# Patient Record
Sex: MALE | Race: WHITE | NOT HISPANIC OR LATINO | Employment: FULL TIME | URBAN - METROPOLITAN AREA
[De-identification: names, ages, dates, MRNs, and addresses within clinical notes are randomized per-mention and may not be internally consistent; named-entity substitution may affect disease eponyms.]

---

## 2017-05-24 ENCOUNTER — TELEPHONE (OUTPATIENT)
Dept: FAMILY MEDICINE | Facility: CLINIC | Age: 52
End: 2017-05-24

## 2017-05-24 NOTE — TELEPHONE ENCOUNTER
----- Message from Faby Rich sent at 5/24/2017  3:22 PM CDT -----  Contact: self  Pt called in about wanting to get appt. Pt also wants to do blood work. Please schedule blood work for pt        Thank You

## 2017-05-29 ENCOUNTER — OFFICE VISIT (OUTPATIENT)
Dept: FAMILY MEDICINE | Facility: CLINIC | Age: 52
End: 2017-05-29
Payer: COMMERCIAL

## 2017-05-29 VITALS
DIASTOLIC BLOOD PRESSURE: 78 MMHG | BODY MASS INDEX: 26.53 KG/M2 | HEIGHT: 72 IN | SYSTOLIC BLOOD PRESSURE: 112 MMHG | OXYGEN SATURATION: 98 % | RESPIRATION RATE: 16 BRPM | TEMPERATURE: 98 F | HEART RATE: 66 BPM | WEIGHT: 195.88 LBS

## 2017-05-29 DIAGNOSIS — Z00.00 ANNUAL PHYSICAL EXAM: Primary | ICD-10-CM

## 2017-05-29 PROCEDURE — 99386 PREV VISIT NEW AGE 40-64: CPT | Mod: S$GLB,,, | Performed by: FAMILY MEDICINE

## 2017-05-29 PROCEDURE — 90715 TDAP VACCINE 7 YRS/> IM: CPT | Mod: S$GLB,,, | Performed by: FAMILY MEDICINE

## 2017-05-29 PROCEDURE — 99999 PR PBB SHADOW E&M-EST. PATIENT-LVL III: CPT | Mod: PBBFAC,,, | Performed by: FAMILY MEDICINE

## 2017-05-29 PROCEDURE — 90471 IMMUNIZATION ADMIN: CPT | Mod: S$GLB,,, | Performed by: FAMILY MEDICINE

## 2017-05-29 RX ORDER — PREDNISONE 20 MG/1
TABLET ORAL
Refills: 0 | COMMUNITY
Start: 2017-04-12 | End: 2017-05-29

## 2017-05-29 RX ORDER — AMOXICILLIN AND CLAVULANATE POTASSIUM 875; 125 MG/1; MG/1
TABLET, FILM COATED ORAL
Refills: 0 | COMMUNITY
Start: 2017-04-12 | End: 2017-05-29

## 2017-05-29 RX ORDER — OSELTAMIVIR PHOSPHATE 75 MG/1
CAPSULE ORAL
Refills: 0 | COMMUNITY
Start: 2017-04-12 | End: 2017-05-29

## 2017-05-29 NOTE — MEDICAL/APP STUDENT
Subjective:       Patient ID: Marty Olivarez is a 51 y.o. male.    Chief Complaint: Establish Care    HPI 51 year old male here to establish care. He has no complaints.   He has no past medical or surgical history. He takes a baby aspirin daily and no other medications.  His family history includes stroke and SLE in sister, MI in brother, and PE post surgery in his mother.   His last dTap was 2006 and is due for another one. He has not gotten bloodwork for over 2 years. He has never been checked for Hep C.  He is up to date on colonoscopy which he had at [facility] a few years ago. He had a colonoscopy and endoscopy done because of reflux symptoms. He reports both were normal. He denies any current reflux.  He does not exercise. He does not smoke. He drinks 6 beers 2-3 times/week. Diet consists of mostly home cooked meals with vegetables, fruits.    Review of Systems   Constitutional: Negative for chills, fever and unexpected weight change.   HENT: Negative for congestion, postnasal drip and sinus pressure.    Respiratory: Positive for cough (mild nonproductive). Negative for shortness of breath.    Cardiovascular: Negative for chest pain, palpitations and leg swelling.   Gastrointestinal: Negative for abdominal pain, constipation, diarrhea, nausea and vomiting.   Genitourinary: Negative for difficulty urinating.   Musculoskeletal: Negative for arthralgias and joint swelling.   Neurological: Negative for weakness, numbness and headaches.   Psychiatric/Behavioral: The patient is not nervous/anxious.        Objective:      Physical Exam   Constitutional: He is oriented to person, place, and time. He appears well-developed and well-nourished. No distress.   HENT:   Head: Normocephalic and atraumatic.   Eyes: EOM are normal. Pupils are equal, round, and reactive to light.   Cardiovascular: Normal rate, regular rhythm and normal heart sounds.    No murmur heard.  Pulmonary/Chest: Effort normal and breath sounds normal.  No respiratory distress.   Abdominal: Soft. Bowel sounds are normal. There is no tenderness.   Lymphadenopathy:     He has no cervical adenopathy.   Neurological: He is alert and oriented to person, place, and time. No cranial nerve deficit.   Power 5/5 throughout. Sensation intact throughout.   Psychiatric: He has a normal mood and affect. His behavior is normal.       Assessment:       1. Annual physical exam        Plan:       1. Annual Physical exam  -Immunizations: Tdap  -Labs: BMP, CBC, Hep C, fasting lipid panel  -Recommend 150 min exercise per week      Whitney STOKES [facility]  Medical Student Yr 4

## 2017-05-29 NOTE — PATIENT INSTRUCTIONS
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
## 2017-05-30 ENCOUNTER — TELEPHONE (OUTPATIENT)
Dept: FAMILY MEDICINE | Facility: CLINIC | Age: 52
End: 2017-05-30

## 2017-06-01 ENCOUNTER — TELEPHONE (OUTPATIENT)
Dept: FAMILY MEDICINE | Facility: CLINIC | Age: 52
End: 2017-06-01

## 2017-06-01 NOTE — TELEPHONE ENCOUNTER
----- Message from Lani Piper MD sent at 6/1/2017  1:44 PM CDT -----  Normal cholesterol panel  Normal kidney function  Negative hepatitis c screening test

## 2017-08-28 PROBLEM — Z00.00 ANNUAL PHYSICAL EXAM: Status: RESOLVED | Noted: 2017-05-29 | Resolved: 2017-08-28

## 2019-07-08 PROBLEM — Z86.0100 HISTORY OF COLON POLYPS: Status: ACTIVE | Noted: 2019-07-08

## 2019-07-08 PROBLEM — E55.9 VITAMIN D INSUFFICIENCY: Status: ACTIVE | Noted: 2019-07-08

## 2019-07-08 PROBLEM — R17 ELEVATED BILIRUBIN: Status: ACTIVE | Noted: 2019-07-08

## 2019-07-08 PROBLEM — Z86.010 HISTORY OF COLON POLYPS: Status: ACTIVE | Noted: 2019-07-08

## 2019-07-08 NOTE — PROGRESS NOTES
"FAMILY MEDICINE    Patient Active Problem List   Diagnosis    Elevated bilirubin    Vitamin D insufficiency    History of colon polyps    Shortness of breath    Family history of early CAD       CC:   Chief Complaint   Patient presents with    Establish Care       HPI: Marty Olivarez is a 53 y.o. male  - with history of colon polyps (last colonoscopy 2/2019 by Dr. Malcolm Kinsey [facility]ERRITT signed and request copy) presents to routine physical and establish care. Reports concerns for shortness of breath    1. Shortness of breath  Onset: 1-2 months  Duration: several seconds to minutes   Presentation: reports that suddenly will get bouts of "catching my breath" and also has noted decreased exercise tolerance but unsure if related with weight gain and decreased exercise since his divorce about 1 year ago. SOB only seems to last several seconds  - concern since brother was diagnosed with complete occlusion of LAD   Aggravating factors: unsure  Relieving factors: rest  Timing of day: anytime  Associated symptoms: cough  Negative symptoms: denies chest pain, leg swelling, weakness, lightheadedness, daiphoresis        HEALTH MAINTENANCE:   Health Maintenance   Topic Date Due    Colonoscopy  09/11/2015    Influenza Vaccine  08/01/2019    Lipid Panel  02/06/2024    TETANUS VACCINE  05/29/2027    Hepatitis C Screening  Completed       ROS: Review of Systems   Constitutional: Negative for activity change, appetite change and unexpected weight change.   HENT: Positive for postnasal drip and rhinorrhea. Negative for congestion, sinus pressure, sinus pain, tinnitus, trouble swallowing and voice change.    Eyes: Negative for visual disturbance.   Respiratory: Positive for cough and shortness of breath. Negative for chest tightness.    Cardiovascular: Negative for chest pain, palpitations and leg swelling.   Gastrointestinal: Negative for abdominal distention, abdominal pain, anal bleeding, blood in stool, constipation, " diarrhea, nausea and vomiting.   Endocrine: Negative for cold intolerance, heat intolerance, polydipsia, polyphagia and polyuria.   Genitourinary: Negative for difficulty urinating and urgency.   Musculoskeletal: Positive for arthralgias (bilateral knees chronic). Negative for gait problem and joint swelling.   Skin: Negative for color change, pallor and rash.   Allergic/Immunologic: Negative.    Neurological: Negative for dizziness, syncope, weakness, light-headedness and headaches.   Hematological: Negative.    Psychiatric/Behavioral: Negative for dysphoric mood and sleep disturbance. The patient is not nervous/anxious.        ALLERGIES:   Review of patient's allergies indicates:  No Known Allergies    MEDS:     Current Outpatient Medications:     varicella-zoster [id number]-[id number], PF, (SHINGRIX, PF,) 50 mcg/0.5 mL injection, Inject 0.5mL IM at 0 and 2-6 months, Disp: 0.5 mL, Rfl: 1    History reviewed. No pertinent past medical history.    History reviewed. No pertinent surgical history.    Family History   Problem Relation Age of Onset    Pulmonary embolism Mother         s/p knee surgery    Hyperlipidemia Father     Hypertension Father     Lupus Sister     Stroke Sister     Heart disease Brother 60        MI LAD occlusion    No Known Problems Daughter     No Known Problems Brother     No Known Problems Daughter        Social History     Tobacco Use    Smoking status: Former Smoker     Last attempt to quit: 1980     Years since quittin.5    Smokeless tobacco: Never Used   Substance Use Topics    Alcohol use: Yes     Alcohol/week: 3.6 oz     Types: 6 Cans of beer per week     Comment: 2-3 times/weekly     Drug use: No       Social History     Social History Narrative    Not on file       OBJECTIVE:   Vitals:    19 0752   BP: 124/86   BP Location: Left arm   Patient Position: Sitting   BP Method: Large (Manual)   Pulse: 68   Temp: 98 °F (36.7 °C)   TempSrc: Oral   SpO2: 98%   Weight: 91.1 kg (200  lb 14.4 oz)   Height: 6' (1.829 m)     Body mass index is 27.25 kg/m².    Physical Exam   Constitutional: He is oriented to person, place, and time. No distress.   HENT:   Head: Normocephalic and atraumatic.   Right Ear: Tympanic membrane and ear canal normal.   Left Ear: Tympanic membrane and ear canal normal.   Nose: Nose normal.   Mouth/Throat: Uvula is midline, oropharynx is clear and moist and mucous membranes are normal.   Eyes: Pupils are equal, round, and reactive to light. Conjunctivae and EOM are normal.   Neck: Trachea normal. Neck supple. Normal carotid pulses and no JVD present. Carotid bruit is not present. No thyromegaly present.   Cardiovascular: Normal rate, regular rhythm, normal heart sounds and intact distal pulses. Exam reveals no gallop and no friction rub.   No murmur heard.  Pulmonary/Chest: Effort normal and breath sounds normal. He has no decreased breath sounds. He has no wheezes. He has no rhonchi. He has no rales.   Abdominal: Soft. Normal appearance and bowel sounds are normal. There is no tenderness.   Neurological: He is alert and oriented to person, place, and time.   Skin: Skin is warm. Capillary refill takes less than 2 seconds. No cyanosis. Nails show no clubbing.         Depression Patient Health Questionnaire 7/9/2019   Over the last two weeks how often have you been bothered by little interest or pleasure in doing things 0   Over the last two weeks how often have you been bothered by feeling down, depressed or hopeless 0   PHQ-2 Total Score 0       PERTINENT RESULTS:   No visits with results within 5 Month(s) from this visit.   Latest known visit with results is:   Lab Visit on 02/06/2019   Component Date Value Ref Range Status    Vit D, 25-Hydroxy 02/06/2019 18* 30 - 96 ng/mL Final    Comment: Vitamin D deficiency.........<10 ng/mL                              Vitamin D insufficiency......10-29 ng/mL       Vitamin D sufficiency........> or equal to 30 ng/mL  Vitamin D  toxicity............>100 ng/mL      WBC 02/06/2019 6.51  3.90 - 12.70 K/uL Final    RBC 02/06/2019 4.63  4.60 - 6.20 M/uL Final    Hemoglobin 02/06/2019 15.1  14.0 - 18.0 g/dL Final    Hematocrit 02/06/2019 42.7  40.0 - 54.0 % Final    Mean Corpuscular Volume 02/06/2019 92  82 - 98 fL Final    Mean Corpuscular Hemoglobin 02/06/2019 32.6* 27.0 - 31.0 pg Final    Mean Corpuscular Hemoglobin Conc 02/06/2019 35.4  32.0 - 36.0 g/dL Final    RDW 02/06/2019 12.4  11.5 - 14.5 % Final    Platelets 02/06/2019 280  150 - 350 K/uL Final    MPV 02/06/2019 10.1  9.2 - 12.9 fL Final    Gran # (ANC) 02/06/2019 4.0  1.8 - 7.7 K/uL Final    Lymph # 02/06/2019 1.7  1.0 - 4.8 K/uL Final    Mono # 02/06/2019 0.6  0.3 - 1.0 K/uL Final    Eos # 02/06/2019 0.2  0.0 - 0.5 K/uL Final    Baso # 02/06/2019 0.04  0.00 - 0.20 K/uL Final    Gran% 02/06/2019 61.2  38.0 - 73.0 % Final    Lymph% 02/06/2019 26.4  18.0 - 48.0 % Final    Mono% 02/06/2019 9.5  4.0 - 15.0 % Final    Eosinophil% 02/06/2019 2.3  0.0 - 8.0 % Final    Basophil% 02/06/2019 0.6  0.0 - 1.9 % Final    Differential Method 02/06/2019 Automated   Final    Sodium 02/06/2019 142  136 - 145 mmol/L Final    Potassium 02/06/2019 4.2  3.5 - 5.1 mmol/L Final    Chloride 02/06/2019 105  95 - 110 mmol/L Final    CO2 02/06/2019 24  23 - 29 mmol/L Final    Glucose 02/06/2019 87  70 - 110 mg/dL Final    BUN, Bld 02/06/2019 18  2 - 20 mg/dL Final    Creatinine 02/06/2019 1.24  0.50 - 1.40 mg/dL Final    Calcium 02/06/2019 9.2  8.7 - 10.5 mg/dL Final    Total Protein 02/06/2019 7.5  6.0 - 8.4 g/dL Final    Albumin 02/06/2019 4.6  3.5 - 5.2 g/dL Final    Total Bilirubin 02/06/2019 2.4* 0.1 - 1.0 mg/dL Final    Comment: For infants and newborns, interpretation of results should be based  on gestational age, weight and in agreement with clinical  observations.  Premature Infant recommended reference ranges:  Up to 24 hours.............<8.0 mg/dL  Up to 48  hours............<12.0 mg/dL  3-5 days..................<15.0 mg/dL  6-29 days.................<15.0 mg/dL      Alkaline Phosphatase 02/06/2019 53  38 - 126 U/L Final    AST 02/06/2019 23  15 - 46 U/L Final    ALT 02/06/2019 23  10 - 44 U/L Final    Anion Gap 02/06/2019 13  8 - 16 mmol/L Final    eGFR if African American 02/06/2019 >60.0  >60 mL/min/1.73 m^2 Final    eGFR if non African American 02/06/2019 >60.0  >60 mL/min/1.73 m^2 Final    Comment: Calculation used to obtain the estimated glomerular filtration  rate (eGFR) is the CKD-EPI equation.       Cholesterol 02/06/2019 170  120 - 199 mg/dL Final    Comment: The National Cholesterol Education Program (NCEP) has set the  following guidelines (reference ranges) for Cholesterol:  Optimal.....................<200 mg/dL  Borderline High.............200-239 mg/dL  High........................> or = 240 mg/dL      Triglycerides 02/06/2019 144  30 - 150 mg/dL Final    Comment: The National Cholesterol Education Program (NCEP) has set the  following guidelines (reference values) for triglycerides:  Normal......................<150 mg/dL  Borderline High.............150-199 mg/dL  High........................200-499 mg/dL      HDL 02/06/2019 44  40 - 75 mg/dL Final    Comment: The National Cholesterol Education Program (NCEP) has set the  following guidelines (reference values) for HDL Cholesterol:  Low...............<40 mg/dL  Optimal...........>60 mg/dL      LDL Cholesterol 02/06/2019 97.2  63.0 - 159.0 mg/dL Final    Comment: The National Cholesterol Education Program (NCEP) has set the  following guidelines (reference values) for LDL Cholesterol:  Optimal.......................<130 mg/dL  Borderline High...............130-159 mg/dL  High..........................160-189 mg/dL  Very High.....................>190 mg/dL      Hdl/Cholesterol Ratio 02/06/2019 25.9  20.0 - 50.0 % Final    Total Cholesterol/HDL Ratio 02/06/2019 3.9  2.0 - 5.0 Final     Non-HDL Cholesterol 02/06/2019 126  mg/dL Final    Comment: Risk category and Non-HDL cholesterol goals:  Coronary heart disease (CHD)or equivalent (10-year risk of CHD >20%):  Non-HDL cholesterol goal     <130 mg/dL  Two or more CHD risk factors and 10-year risk of CHD <= 20%:  Non-HDL cholesterol goal     <160 mg/dL  0 to 1 CHD risk factor:  Non-HDL cholesterol goal     <190 mg/dL      PSA, SCREEN 02/06/2019 0.86  0.00 - 4.00 ng/mL Final    Comment: PSA Expected levels:  Hormonal Therapy: <0.05 ng/ml  Prostatectomy: <0.01 ng/ml  Radiation Therapy: <1.00 ng/ml         ASSESSMENT:  Problem List Items Addressed This Visit        Endocrine    Vitamin D insufficiency    Current Assessment & Plan     - rec supplement OTC D3 1303-8727 units daily            GI    Elevated bilirubin    Current Assessment & Plan     - asymptomatic  - likely Gilbert's   - monitor         History of colon polyps    Overview     - followed by GI Dr. Malcolm Kinsey         Current Assessment & Plan     - request copy of colonoscopy  - pt reports due on 3 years            Other    Shortness of breath - Primary    Current Assessment & Plan     - exam benign  - EKG today  - rec CXR  - last 2/2019 reviewed  - refer to Cardiology due to family history         Relevant Orders    EKG 12-lead    X-Ray Chest PA And Lateral    Family history of early CAD    Relevant Orders    Ambulatory referral to Cardiology          PLAN:   Orders Placed This Encounter    X-Ray Chest PA And Lateral    Ambulatory referral to Cardiology    EKG 12-lead    varicella-zoster [id number]-AS01B, PF, (SHINGRIX, PF,) 50 mcg/0.5 mL injection     Follow-up in yearly or as needed.     Dr. Gaye Martin D.O.   Family Medicine

## 2019-07-09 ENCOUNTER — OFFICE VISIT (OUTPATIENT)
Dept: FAMILY MEDICINE | Facility: CLINIC | Age: 54
End: 2019-07-09
Payer: COMMERCIAL

## 2019-07-09 ENCOUNTER — TELEPHONE (OUTPATIENT)
Dept: FAMILY MEDICINE | Facility: CLINIC | Age: 54
End: 2019-07-09

## 2019-07-09 VITALS
HEART RATE: 68 BPM | WEIGHT: 200.88 LBS | TEMPERATURE: 98 F | BODY MASS INDEX: 27.21 KG/M2 | HEIGHT: 72 IN | DIASTOLIC BLOOD PRESSURE: 86 MMHG | SYSTOLIC BLOOD PRESSURE: 124 MMHG | OXYGEN SATURATION: 98 %

## 2019-07-09 DIAGNOSIS — R06.02 SHORTNESS OF BREATH: Primary | ICD-10-CM

## 2019-07-09 DIAGNOSIS — Z82.49 FAMILY HISTORY OF EARLY CAD: ICD-10-CM

## 2019-07-09 DIAGNOSIS — E55.9 VITAMIN D INSUFFICIENCY: ICD-10-CM

## 2019-07-09 DIAGNOSIS — Z86.010 HISTORY OF COLON POLYPS: ICD-10-CM

## 2019-07-09 DIAGNOSIS — R17 ELEVATED BILIRUBIN: ICD-10-CM

## 2019-07-09 PROCEDURE — 93010 EKG 12-LEAD: ICD-10-PCS | Mod: S$GLB,,, | Performed by: INTERNAL MEDICINE

## 2019-07-09 PROCEDURE — 93005 EKG 12-LEAD: ICD-10-PCS | Mod: S$GLB,,, | Performed by: FAMILY MEDICINE

## 2019-07-09 PROCEDURE — 99214 PR OFFICE/OUTPT VISIT, EST, LEVL IV, 30-39 MIN: ICD-10-PCS | Mod: 25,S$GLB,, | Performed by: FAMILY MEDICINE

## 2019-07-09 PROCEDURE — 99214 OFFICE O/P EST MOD 30 MIN: CPT | Mod: 25,S$GLB,, | Performed by: FAMILY MEDICINE

## 2019-07-09 PROCEDURE — 99999 PR PBB SHADOW E&M-EST. PATIENT-LVL IV: CPT | Mod: PBBFAC,,, | Performed by: FAMILY MEDICINE

## 2019-07-09 PROCEDURE — 99999 PR PBB SHADOW E&M-EST. PATIENT-LVL IV: ICD-10-PCS | Mod: PBBFAC,,, | Performed by: FAMILY MEDICINE

## 2019-07-09 PROCEDURE — 93005 ELECTROCARDIOGRAM TRACING: CPT | Mod: S$GLB,,, | Performed by: FAMILY MEDICINE

## 2019-07-09 PROCEDURE — 93010 ELECTROCARDIOGRAM REPORT: CPT | Mod: S$GLB,,, | Performed by: INTERNAL MEDICINE

## 2019-07-09 NOTE — PATIENT INSTRUCTIONS
1. I recommend the following vaccine: Shingles (2 shot series)  - these vaccine have been ordered to your pharmacy  - please ask for them the next time your are there and your pharmacist will administer the vaccine  2. Vitamin D is low and recommend over the counter D3 2000 units daily

## 2019-07-09 NOTE — TELEPHONE ENCOUNTER
----- Message from Gaye Martin DO sent at 7/9/2019  9:36 AM CDT -----  Please call pt. Chest Xray normal. Lungs clear and heart normal size

## 2019-07-09 NOTE — ASSESSMENT & PLAN NOTE
- exam benign  - EKG today  - rec CXR  - last 2/2019 reviewed  - refer to Cardiology due to family history

## 2019-07-16 ENCOUNTER — TELEPHONE (OUTPATIENT)
Dept: ADMINISTRATIVE | Facility: HOSPITAL | Age: 54
End: 2019-07-16

## 2020-07-31 ENCOUNTER — TELEPHONE (OUTPATIENT)
Dept: FAMILY MEDICINE | Facility: CLINIC | Age: 55
End: 2020-07-31

## 2020-07-31 DIAGNOSIS — Z00.00 ROUTINE MEDICAL EXAM: ICD-10-CM

## 2020-07-31 DIAGNOSIS — Z00.01 ENCOUNTER FOR GENERAL ADULT MEDICAL EXAMINATION WITH ABNORMAL FINDINGS: Primary | ICD-10-CM

## 2020-07-31 DIAGNOSIS — Z12.5 SCREENING FOR PROSTATE CANCER: ICD-10-CM

## 2020-07-31 DIAGNOSIS — Z11.4 SCREENING FOR HIV (HUMAN IMMUNODEFICIENCY VIRUS): ICD-10-CM

## 2020-07-31 NOTE — TELEPHONE ENCOUNTER
Please call patient. He needs to scheduled his annual exam and labs. Labs ordered and recommend have done 1 week prior to visit    Orders Placed This Encounter    Comprehensive metabolic panel    HIV 1/2 Ag/Ab (4th Gen)    Lipid Panel    PSA, Screening     Dr. Gaye Martin D.O.   [facility]

## 2020-09-01 ENCOUNTER — OFFICE VISIT (OUTPATIENT)
Dept: FAMILY MEDICINE | Facility: CLINIC | Age: 55
End: 2020-09-01
Payer: COMMERCIAL

## 2020-09-01 VITALS
OXYGEN SATURATION: 98 % | TEMPERATURE: 98 F | HEART RATE: 72 BPM | WEIGHT: 182.19 LBS | SYSTOLIC BLOOD PRESSURE: 110 MMHG | RESPIRATION RATE: 18 BRPM | DIASTOLIC BLOOD PRESSURE: 70 MMHG | HEIGHT: 73 IN | BODY MASS INDEX: 24.15 KG/M2

## 2020-09-01 DIAGNOSIS — R17 ELEVATED BILIRUBIN: ICD-10-CM

## 2020-09-01 DIAGNOSIS — Z86.010 HISTORY OF COLON POLYPS: ICD-10-CM

## 2020-09-01 DIAGNOSIS — Z00.00 ROUTINE MEDICAL EXAM: Primary | ICD-10-CM

## 2020-09-01 DIAGNOSIS — Z82.49 FAMILY HISTORY OF CORONARY ARTERY DISEASE IN BROTHER: ICD-10-CM

## 2020-09-01 PROBLEM — R06.02 SHORTNESS OF BREATH: Status: RESOLVED | Noted: 2019-07-09 | Resolved: 2020-09-01

## 2020-09-01 PROCEDURE — 99396 PR PREVENTIVE VISIT,EST,40-64: ICD-10-PCS | Mod: S$GLB,,, | Performed by: FAMILY MEDICINE

## 2020-09-01 PROCEDURE — 99999 PR PBB SHADOW E&M-EST. PATIENT-LVL IV: ICD-10-PCS | Mod: PBBFAC,,, | Performed by: FAMILY MEDICINE

## 2020-09-01 PROCEDURE — 99999 PR PBB SHADOW E&M-EST. PATIENT-LVL IV: CPT | Mod: PBBFAC,,, | Performed by: FAMILY MEDICINE

## 2020-09-01 PROCEDURE — 99396 PREV VISIT EST AGE 40-64: CPT | Mod: S$GLB,,, | Performed by: FAMILY MEDICINE

## 2020-09-01 RX ORDER — ZOSTER VACCINE RECOMBINANT, ADJUVANTED 50 MCG/0.5
0.5 KIT INTRAMUSCULAR ONCE
Qty: 1 EACH | Refills: 0 | Status: SHIPPED | OUTPATIENT
Start: 2020-09-01 | End: 2020-09-01

## 2020-09-01 NOTE — PROGRESS NOTES
"FAMILY MEDICINE  [facility]    Patient Active Problem List   Diagnosis    Routine medical exam    Elevated bilirubin    Vitamin D insufficiency    History of colon polyps    Family history of coronary artery disease in brother       CC:   Chief Complaint   Patient presents with    Annual Exam       HPI: Marty Olivarez is a 54 y.o. male  has Elevated bilirubin; Vitamin D insufficiency; History of colon polyps; and Family history of early CAD on their problem list.  - presents for routine annual physical and denies any complaints    Cardiologist Dr. Rangel  - saw him 2019 due to continue with brother MI. Reports that had stress testing and CCS and everything was "good"    Today reports doing well. He has been working on losing weight during Covid-19. He stopped drinking alcohol and avoiding processed carbohydrates and reports that he has lost > 20 lbs. He feels great and exercising regularly    Wt Readings from Last 5 Encounters:  09/01/20 : 82.6 kg (182 lb 3.2 oz)  07/09/19 : 91.1 kg (200 lb 14.4 oz)  05/29/17 : 88.9 kg (195 lb 14.4 oz)          HEALTH MAINTENANCE:   Health Maintenance   Topic Date Due    Lipid Panel  08/25/2025    TETANUS VACCINE  05/29/2027    Hepatitis C Screening  Completed     Health Maintenance Topics with due status: Not Due       Topic Last Completion Date    TETANUS VACCINE 05/29/2017    Colorectal Cancer Screening 02/27/2019    Lipid Panel 08/25/2020     Health Maintenance Due   Topic Date Due    Shingles Vaccine (2 of 2) 09/03/2019    Influenza Vaccine (1) 09/01/2020       ROS: Review of Systems   Constitutional: Negative.    HENT: Negative.    Eyes: Negative.    Respiratory: Negative.    Cardiovascular: Negative.    Gastrointestinal: Negative.    Endocrine: Negative.    Genitourinary: Negative.    Musculoskeletal: Negative.    Skin: Negative.    Allergic/Immunologic: Negative.    Neurological: Negative.    Hematological: Negative.    Psychiatric/Behavioral: Negative.  " "      ALLERGIES:   Review of patient's allergies indicates:  No Known Allergies    MEDS:     Current Outpatient Medications:     varicella-zoster gE-AS01B, PF, (SHINGRIX, PF,) 50 mcg/0.5 mL injection, Inject 0.5 mLs into the muscle once. Inject 0.5mL IM at 0 and 2-6 months for 1 dose, Disp: 1 each, Rfl: 0    History reviewed. No pertinent past medical history.    History reviewed. No pertinent surgical history.    Family History   Problem Relation Age of Onset    Pulmonary embolism Mother         s/p knee surgery    Hyperlipidemia Father     Hypertension Father     Lupus Sister     Stroke Sister     Heart disease Brother 60        MI LAD occlusion    No Known Problems Daughter     No Known Problems Brother     No Known Problems Daughter        Social History     Tobacco Use    Smoking status: Former Smoker     Quit date:      Years since quittin.6    Smokeless tobacco: Never Used   Substance Use Topics    Alcohol use: Yes     Alcohol/week: 6.0 standard drinks     Types: 6 Cans of beer per week     Comment: 2-3 times/weekly     Drug use: No       Social History     Social History Narrative    . 2 daughters (2020 27 yo and 19yo) Cares for his mother and father and lives with them       OBJECTIVE:   Vitals:    20 1339   BP: 110/70   BP Location: Left arm   Patient Position: Sitting   BP Method: X-Large (Manual)   Pulse: 72   Resp: 18   Temp: 98.1 °F (36.7 °C)   TempSrc: Oral   SpO2: 98%   Weight: 82.6 kg (182 lb 3.2 oz)   Height: 6' 1" (1.854 m)     Body mass index is 24.04 kg/m².    Physical Exam  Vitals signs reviewed.   Constitutional:       General: He is not in acute distress.     Appearance: Normal appearance.   HENT:      Head: Normocephalic and atraumatic.      Right Ear: Tympanic membrane and ear canal normal.      Left Ear: Tympanic membrane and ear canal normal.      Nose: Nose normal.      Mouth/Throat:      Pharynx: Uvula midline.   Eyes:      Conjunctiva/sclera: " Conjunctivae normal.      Pupils: Pupils are equal, round, and reactive to light.   Neck:      Musculoskeletal: Neck supple.      Thyroid: No thyromegaly.      Vascular: Normal carotid pulses. No carotid bruit or JVD.      Trachea: Trachea normal.   Cardiovascular:      Rate and Rhythm: Regular rhythm.      Heart sounds: Normal heart sounds. No murmur.   Pulmonary:      Effort: Pulmonary effort is normal.      Breath sounds: Normal breath sounds. No decreased breath sounds, wheezing, rhonchi or rales.   Abdominal:      General: Bowel sounds are normal.      Palpations: Abdomen is soft.      Tenderness: There is no abdominal tenderness.   Skin:     General: Skin is warm.      Capillary Refill: Capillary refill takes less than 2 seconds.      Nails: There is no clubbing.     Neurological:      Mental Status: He is alert and oriented to person, place, and time.           Depression Patient Health Questionnaire 9/1/2020 7/9/2019   Over the last two weeks how often have you been bothered by little interest or pleasure in doing things 0 0   Over the last two weeks how often have you been bothered by feeling down, depressed or hopeless 0 0   PHQ-2 Total Score 0 0       PERTINENT RESULTS:   No visits with results within 1 Week(s) from this visit.   Latest known visit with results is:   Lab Visit on 08/25/2020   Component Date Value Ref Range Status    Sodium 08/25/2020 144  136 - 145 mmol/L Final    Potassium 08/25/2020 4.2  3.5 - 5.1 mmol/L Final    Chloride 08/25/2020 106  95 - 110 mmol/L Final    CO2 08/25/2020 30* 23 - 29 mmol/L Final    Glucose 08/25/2020 95  70 - 110 mg/dL Final    BUN, Bld 08/25/2020 12  2 - 20 mg/dL Final    Creatinine 08/25/2020 1.04  0.50 - 1.40 mg/dL Final    Calcium 08/25/2020 8.9  8.7 - 10.5 mg/dL Final    Total Protein 08/25/2020 6.9  6.0 - 8.4 g/dL Final    Albumin 08/25/2020 4.3  3.5 - 5.2 g/dL Final    Total Bilirubin 08/25/2020 1.8* 0.1 - 1.0 mg/dL Final    Comment: For infants  and newborns, interpretation of results should be based  on gestational age, weight and in agreement with clinical  observations.  Premature Infant recommended reference ranges:  Up to 24 hours.............<8.0 mg/dL  Up to 48 hours............<12.0 mg/dL  3-5 days..................<15.0 mg/dL  6-29 days.................<15.0 mg/dL      Alkaline Phosphatase 08/25/2020 49  38 - 126 U/L Final    AST 08/25/2020 21  15 - 46 U/L Final    ALT 08/25/2020 15  10 - 44 U/L Final    Anion Gap 08/25/2020 8  8 - 16 mmol/L Final    eGFR if African American 08/25/2020 >60.0  >60 mL/min/1.73 m^2 Final    eGFR if non African American 08/25/2020 >60.0  >60 mL/min/1.73 m^2 Final    Comment: Calculation used to obtain the estimated glomerular filtration  rate (eGFR) is the CKD-EPI equation.       HIV 1/2 Ag/Ab 08/25/2020 Negative  Negative Final    Cholesterol 08/25/2020 158  120 - 199 mg/dL Final    Comment: The National Cholesterol Education Program (NCEP) has set the  following guidelines (reference ranges) for Cholesterol:  Optimal.....................<200 mg/dL  Borderline High.............200-239 mg/dL  High........................> or = 240 mg/dL      Triglycerides 08/25/2020 107  30 - 150 mg/dL Final    Comment: The National Cholesterol Education Program (NCEP) has set the  following guidelines (reference values) for triglycerides:  Normal......................<150 mg/dL  Borderline High.............150-199 mg/dL  High........................200-499 mg/dL      HDL 08/25/2020 44  40 - 75 mg/dL Final    Comment: The National Cholesterol Education Program (NCEP) has set the  following guidelines (reference values) for HDL Cholesterol:  Low...............<40 mg/dL  Optimal...........>60 mg/dL      LDL Cholesterol 08/25/2020 92.6  63.0 - 159.0 mg/dL Final    Comment: The National Cholesterol Education Program (NCEP) has set the  following guidelines (reference values) for LDL  Cholesterol:  Optimal.......................<130 mg/dL  Borderline High...............130-159 mg/dL  High..........................160-189 mg/dL  Very High.....................>190 mg/dL      Hdl/Cholesterol Ratio 08/25/2020 27.8  20.0 - 50.0 % Final    Total Cholesterol/HDL Ratio 08/25/2020 3.6  2.0 - 5.0 Final    Non-HDL Cholesterol 08/25/2020 114  mg/dL Final    Comment: Risk category and Non-HDL cholesterol goals:  Coronary heart disease (CHD)or equivalent (10-year risk of CHD >20%):  Non-HDL cholesterol goal     <130 mg/dL  Two or more CHD risk factors and 10-year risk of CHD <= 20%:  Non-HDL cholesterol goal     <160 mg/dL  0 to 1 CHD risk factor:  Non-HDL cholesterol goal     <190 mg/dL      PSA, SCREEN 08/25/2020 1.5  0.00 - 4.00 ng/mL Final    Comment: PSA Expected levels:  Hormonal Therapy: <0.05 ng/ml  Prostatectomy: <0.01 ng/ml  Radiation Therapy: <1.00 ng/ml         ASSESSMENT/PLAN:  Problem List Items Addressed This Visit        GI    Elevated bilirubin    Overview     - chronic  - asymptomatic  - likely Gilbert's syndrome         History of colon polyps    Overview     - followed by GI Dr. Malcolm Kinsey  - 2/27/2019 colonoscopy 1 polyp removed and recommended to follow-up 5 years  - next colonoscopy due 2024            Other    Family history of coronary artery disease in brother    Overview     - brother MI at 60 years old         Routine medical exam - Primary    Current Assessment & Plan     - preventative health counseling  - counseling on current recommendations for colon cancer screening. Pt up to date  - counseling on current recommendations for shared decision making in prostate cancer screening. Pt opted for PSA screening up to date  - Vaccines recommended at this visit: see below         Relevant Medications    varicella-zoster gE-AS01B, PF, (SHINGRIX, PF,) 50 mcg/0.5 mL injection          ORDERS:   Orders Placed This Encounter    varicella-zoster gE-AS01B, PF, (SHINGRIX, PF,) 50  mcg/0.5 mL injection     Vaccines recommended: Shingrix #2, flu vaccine    Follow-up in 1year or sooner if any concerns.    Dr. Gaye Martin D.O.   [facility]

## 2020-09-01 NOTE — ASSESSMENT & PLAN NOTE
- preventative health counseling  - counseling on current recommendations for colon cancer screening. Pt up to date  - counseling on current recommendations for shared decision making in prostate cancer screening. Pt opted for PSA screening up to date  - Vaccines recommended at this visit: see below

## 2020-12-04 ENCOUNTER — PATIENT OUTREACH (OUTPATIENT)
Dept: ADMINISTRATIVE | Facility: HOSPITAL | Age: 55
End: 2020-12-04

## 2020-12-04 NOTE — PROGRESS NOTES
Scanned updated immunization form from [facility] in .   Updated immunizations.

## 2020-12-07 PROBLEM — Z00.00 ROUTINE MEDICAL EXAM: Status: RESOLVED | Noted: 2017-05-29 | Resolved: 2020-12-07

## 2021-03-19 ENCOUNTER — OFFICE VISIT (OUTPATIENT)
Dept: UROLOGY | Facility: CLINIC | Age: 56
End: 2021-03-19
Payer: COMMERCIAL

## 2021-03-19 VITALS
RESPIRATION RATE: 18 BRPM | HEIGHT: 73 IN | DIASTOLIC BLOOD PRESSURE: 70 MMHG | WEIGHT: 196.19 LBS | OXYGEN SATURATION: 99 % | HEART RATE: 80 BPM | SYSTOLIC BLOOD PRESSURE: 102 MMHG | BODY MASS INDEX: 26 KG/M2 | TEMPERATURE: 98 F

## 2021-03-19 DIAGNOSIS — N50.89 SCROTAL MASS: ICD-10-CM

## 2021-03-19 DIAGNOSIS — R35.1 NOCTURIA: Primary | ICD-10-CM

## 2021-03-19 PROBLEM — Z30.09 STERILIZATION CONSULT: Status: ACTIVE | Noted: 2017-05-29

## 2021-03-19 PROCEDURE — 99999 PR PBB SHADOW E&M-EST. PATIENT-LVL IV: CPT | Mod: PBBFAC,,, | Performed by: UROLOGY

## 2021-03-19 PROCEDURE — 99999 PR PBB SHADOW E&M-EST. PATIENT-LVL IV: ICD-10-PCS | Mod: PBBFAC,,, | Performed by: UROLOGY

## 2021-03-19 PROCEDURE — 99202 PR OFFICE/OUTPT VISIT, NEW, LEVL II, 15-29 MIN: ICD-10-PCS | Mod: S$GLB,,, | Performed by: UROLOGY

## 2021-03-19 PROCEDURE — 99202 OFFICE O/P NEW SF 15 MIN: CPT | Mod: S$GLB,,, | Performed by: UROLOGY

## 2021-03-19 RX ORDER — DIAZEPAM 10 MG/1
10 TABLET ORAL
Qty: 1 TABLET | Refills: 0 | Status: SHIPPED | OUTPATIENT
Start: 2021-03-19 | End: 2023-08-17

## 2021-03-19 RX ORDER — HYDROCODONE BITARTRATE AND ACETAMINOPHEN 5; 325 MG/1; MG/1
1 TABLET ORAL EVERY 6 HOURS PRN
Qty: 15 TABLET | Refills: 0 | Status: SHIPPED | OUTPATIENT
Start: 2021-03-19 | End: 2023-08-17

## 2021-03-19 RX ORDER — CIPROFLOXACIN 500 MG/1
500 TABLET ORAL 2 TIMES DAILY
Qty: 10 TABLET | Refills: 0 | Status: SHIPPED | OUTPATIENT
Start: 2021-03-19 | End: 2021-03-24

## 2021-04-16 ENCOUNTER — PROCEDURE VISIT (OUTPATIENT)
Dept: UROLOGY | Facility: CLINIC | Age: 56
End: 2021-04-16
Payer: COMMERCIAL

## 2021-04-16 VITALS — TEMPERATURE: 98 F | HEIGHT: 73 IN | BODY MASS INDEX: 25.89 KG/M2

## 2021-04-16 DIAGNOSIS — Z30.2 ENCOUNTER FOR VASECTOMY: ICD-10-CM

## 2021-04-16 DIAGNOSIS — Z98.52 H/O VASECTOMY: Primary | ICD-10-CM

## 2021-04-16 PROCEDURE — 55250 REMOVAL OF SPERM DUCT(S): CPT | Mod: S$GLB,,, | Performed by: UROLOGY

## 2021-04-16 PROCEDURE — 55250 VASECTOMY: ICD-10-PCS | Mod: S$GLB,,, | Performed by: UROLOGY

## 2021-04-16 RX ORDER — CIPROFLOXACIN 500 MG/1
500 TABLET ORAL 2 TIMES DAILY
Qty: 10 TABLET | Refills: 0 | Status: SHIPPED | OUTPATIENT
Start: 2021-04-16 | End: 2021-04-21

## 2021-05-04 ENCOUNTER — TELEPHONE (OUTPATIENT)
Dept: UROLOGY | Facility: CLINIC | Age: 56
End: 2021-05-04

## 2021-05-26 DIAGNOSIS — Z30.2 ENCOUNTER FOR VASECTOMY: ICD-10-CM

## 2021-05-26 DIAGNOSIS — Z98.52 H/O VASECTOMY: Primary | ICD-10-CM

## 2021-06-28 DIAGNOSIS — Z98.52 H/O VASECTOMY: Primary | ICD-10-CM

## 2021-08-20 ENCOUNTER — TELEPHONE (OUTPATIENT)
Dept: FAMILY MEDICINE | Facility: CLINIC | Age: 56
End: 2021-08-20

## 2021-08-20 DIAGNOSIS — U07.1 COVID-19: Primary | ICD-10-CM

## 2021-08-26 ENCOUNTER — PATIENT MESSAGE (OUTPATIENT)
Dept: FAMILY MEDICINE | Facility: CLINIC | Age: 56
End: 2021-08-26

## 2021-08-26 DIAGNOSIS — R05.9 COUGH: Primary | ICD-10-CM

## 2021-08-27 ENCOUNTER — PATIENT MESSAGE (OUTPATIENT)
Dept: FAMILY MEDICINE | Facility: CLINIC | Age: 56
End: 2021-08-27

## 2021-10-04 ENCOUNTER — PATIENT MESSAGE (OUTPATIENT)
Dept: ADMINISTRATIVE | Facility: HOSPITAL | Age: 56
End: 2021-10-04

## 2021-10-13 ENCOUNTER — PATIENT MESSAGE (OUTPATIENT)
Dept: FAMILY MEDICINE | Facility: CLINIC | Age: 56
End: 2021-10-13

## 2021-10-13 DIAGNOSIS — Z00.01 ENCOUNTER FOR GENERAL ADULT MEDICAL EXAMINATION WITH ABNORMAL FINDINGS: Primary | ICD-10-CM

## 2022-01-10 ENCOUNTER — PATIENT MESSAGE (OUTPATIENT)
Dept: ADMINISTRATIVE | Facility: HOSPITAL | Age: 57
End: 2022-01-10
Payer: COMMERCIAL

## 2022-05-31 ENCOUNTER — PATIENT MESSAGE (OUTPATIENT)
Dept: ADMINISTRATIVE | Facility: HOSPITAL | Age: 57
End: 2022-05-31
Payer: COMMERCIAL

## 2022-08-12 ENCOUNTER — PATIENT MESSAGE (OUTPATIENT)
Dept: FAMILY MEDICINE | Facility: CLINIC | Age: 57
End: 2022-08-12
Payer: COMMERCIAL

## 2022-12-13 ENCOUNTER — TELEPHONE (OUTPATIENT)
Dept: PRIMARY CARE CLINIC | Facility: CLINIC | Age: 57
End: 2022-12-13
Payer: COMMERCIAL

## 2022-12-13 DIAGNOSIS — Z00.01 ENCOUNTER FOR GENERAL ADULT MEDICAL EXAMINATION WITH ABNORMAL FINDINGS: Primary | ICD-10-CM

## 2022-12-13 NOTE — TELEPHONE ENCOUNTER
Orders Placed This Encounter    CBC Without Differential    Comprehensive Metabolic Panel    Lipid Panel    PSA, Screening    Hemoglobin A1C     Dr. Gaye Martin D.O.   [facility]

## 2023-01-03 ENCOUNTER — OFFICE VISIT (OUTPATIENT)
Dept: PRIMARY CARE CLINIC | Facility: CLINIC | Age: 58
End: 2023-01-03
Attending: FAMILY MEDICINE
Payer: COMMERCIAL

## 2023-01-03 VITALS
HEART RATE: 82 BPM | HEIGHT: 73 IN | SYSTOLIC BLOOD PRESSURE: 126 MMHG | WEIGHT: 199.63 LBS | DIASTOLIC BLOOD PRESSURE: 82 MMHG | BODY MASS INDEX: 26.46 KG/M2

## 2023-01-03 DIAGNOSIS — I86.1 LEFT VARICOCELE: ICD-10-CM

## 2023-01-03 DIAGNOSIS — R17 ELEVATED BILIRUBIN: ICD-10-CM

## 2023-01-03 DIAGNOSIS — Z86.010 HISTORY OF COLON POLYPS: ICD-10-CM

## 2023-01-03 DIAGNOSIS — Z00.01 ENCOUNTER FOR GENERAL ADULT MEDICAL EXAMINATION WITH ABNORMAL FINDINGS: Primary | ICD-10-CM

## 2023-01-03 DIAGNOSIS — Z82.49 FAMILY HISTORY OF CORONARY ARTERY DISEASE IN BROTHER: ICD-10-CM

## 2023-01-03 PROBLEM — Z30.2 ENCOUNTER FOR VASECTOMY: Status: RESOLVED | Noted: 2021-04-16 | Resolved: 2023-01-03

## 2023-01-03 PROBLEM — Z30.09 STERILIZATION CONSULT: Status: RESOLVED | Noted: 2017-05-29 | Resolved: 2023-01-03

## 2023-01-03 PROBLEM — E55.9 VITAMIN D INSUFFICIENCY: Status: RESOLVED | Noted: 2019-07-08 | Resolved: 2023-01-03

## 2023-01-03 PROCEDURE — 99396 PREV VISIT EST AGE 40-64: CPT | Mod: 25,S$GLB,, | Performed by: FAMILY MEDICINE

## 2023-01-03 PROCEDURE — 99999 PR PBB SHADOW E&M-EST. PATIENT-LVL III: ICD-10-PCS | Mod: PBBFAC,,, | Performed by: FAMILY MEDICINE

## 2023-01-03 PROCEDURE — 90471 IMMUNIZATION ADMIN: CPT | Mod: S$GLB,,, | Performed by: FAMILY MEDICINE

## 2023-01-03 PROCEDURE — 90686 IIV4 VACC NO PRSV 0.5 ML IM: CPT | Mod: S$GLB,,, | Performed by: FAMILY MEDICINE

## 2023-01-03 PROCEDURE — 99999 PR PBB SHADOW E&M-EST. PATIENT-LVL III: CPT | Mod: PBBFAC,,, | Performed by: FAMILY MEDICINE

## 2023-01-03 PROCEDURE — 90686 FLU VACCINE (QUAD) GREATER THAN OR EQUAL TO 3YO PRESERVATIVE FREE IM: ICD-10-PCS | Mod: S$GLB,,, | Performed by: FAMILY MEDICINE

## 2023-01-03 PROCEDURE — 99396 PR PREVENTIVE VISIT,EST,40-64: ICD-10-PCS | Mod: 25,S$GLB,, | Performed by: FAMILY MEDICINE

## 2023-01-03 PROCEDURE — 90471 FLU VACCINE (QUAD) GREATER THAN OR EQUAL TO 3YO PRESERVATIVE FREE IM: ICD-10-PCS | Mod: S$GLB,,, | Performed by: FAMILY MEDICINE

## 2023-01-03 NOTE — PROGRESS NOTES
"FAMILY MEDICINE  [facility]    Reason for visit:   Chief Complaint   Patient presents with    Annual Exam         SUBJECTIVE: Marty Olivarez is a 57 y.o. male  - history of colon polyps and family history of coronary artery disease presents for routine physical    Pain management:  Dr. Freddy Porras  Urology:  Dr. Swartz   Cardiology: Dr. Rangel  Cardiologist Dr. Rangel  - saw him 2019 due to continue with brother MI. Reports that had stress testing and CCS and everything was "good"    Today he reports he is doing well.  He denies any concerns or complaints.  He admits that he has not been exercising regularly or eating healthy this last few years since caring for his parents.  His father passed away earlier last year due COVID-19.  Cares for his mother with dementia.  He is expecting his 1st grandson later this month.        Review of Systems   All other systems reviewed and are negative.    HEALTH MAINTENANCE:   Health Maintenance   Topic Date Due    PROSTATE-SPECIFIC ANTIGEN  12/28/2023    TETANUS VACCINE  05/29/2027    Lipid Panel  12/28/2027    Hepatitis C Screening  Completed     Health Maintenance Topics with due status: Not Due       Topic Last Completion Date    TETANUS VACCINE 05/29/2017    Colorectal Cancer Screening 02/27/2019    PROSTATE-SPECIFIC ANTIGEN 12/28/2022    Lipid Panel 12/28/2022     Health Maintenance Due   Topic Date Due    COVID-19 Vaccine (1) Never done       HISTORY:   Past Medical History:   Diagnosis Date    Encounter for vasectomy 4/16/2021    Sterilization consult 5/29/2017    Urinary tract infection        Past Surgical History:   Procedure Laterality Date    VASECTOMY  04/16/2021       Family History   Problem Relation Age of Onset    Pulmonary embolism Mother         s/p knee surgery    Hyperlipidemia Father     Hypertension Father     Lupus Sister     Stroke Sister     Heart disease Brother 60        MI LAD occlusion    No Known Problems Brother     No " Known Problems Daughter     No Known Problems Daughter     Prostate cancer Neg Hx     Kidney disease Neg Hx        Social History     Tobacco Use    Smoking status: Former     Types: Cigarettes     Quit date:      Years since quittin.0    Smokeless tobacco: Never   Substance Use Topics    Alcohol use: Yes     Alcohol/week: 6.0 standard drinks     Types: 6 Cans of beer per week     Comment: 2-3 times/weekly     Drug use: No       Social History     Social History Narrative    . 2 daughters ( 29 yo and 23 yo) Cares for his mother who lives with him. Older daughter expecting first son is few weeks (she lost a pregnancy 2022). 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        ALLERGIES:   Review of patient's allergies indicates:  No Known Allergies    MEDS:     Current Outpatient Medications:     diazePAM (VALIUM) 10 MG Tab, Take 1 tablet (10 mg total) by mouth On call Procedure (Vasectomy)., Disp: 1 tablet, Rfl: 0    HYDROcodone-acetaminophen (NORCO) 5-325 mg per tablet, Take 1 tablet by mouth every 6 (six) hours as needed for Pain. (Patient not taking: Reported on 1/3/2023), Disp: 15 tablet, Rfl: 0    Current Facility-Administered Medications:     acetaminophen tablet 650 mg, 650 mg, Oral, Once PRN, Gaye Martin, DO    albuterol inhaler 2 puff, 2 puff, Inhalation, Q20 Min PRN, Gaye Martin, DO    diphenhydrAMINE injection 25 mg, 25 mg, Intravenous, Once PRN, Gaye Martin, DO    EPINEPHrine (EPIPEN) 0.3 mg/0.3 mL pen injection 0.3 mg, 0.3 mg, Intramuscular, PRN, Gaye Martin, DO    methylPREDNISolone sodium succinate injection 40 mg, 40 mg, Intravenous, Once PRN, Gaye Martin, DO    ondansetron disintegrating tablet 4 mg, 4 mg, Oral, Once PRN, Gaye Martin, DO    sodium chloride 0.9% 500 mL flush bag, , Intravenous, PRN, Gaye Martin, DO    sodium chloride 0.9% flush 10 mL, 10 mL, Intravenous, PRN, Gaye Martin, DO      Vital signs:   Vitals:    23 0828 23 0844   BP:  "(!) 124/92 126/82   BP Location: Left arm    Patient Position: Sitting    BP Method: Medium (Manual)    Pulse: 82    Weight: 90.5 kg (199 lb 10 oz)    Height: 6' 1" (1.854 m)      Body mass index is 26.34 kg/m².  PHYSICAL EXAM:     Physical Exam  Vitals reviewed.   Constitutional:       General: He is not in acute distress.     Appearance: Normal appearance.   HENT:      Head: Normocephalic and atraumatic.      Right Ear: Tympanic membrane and ear canal normal.      Left Ear: Tympanic membrane and ear canal normal.      Nose: Nose normal.      Mouth/Throat:      Pharynx: Uvula midline.   Eyes:      Conjunctiva/sclera: Conjunctivae normal.      Pupils: Pupils are equal, round, and reactive to light.   Neck:      Thyroid: No thyromegaly.      Vascular: Normal carotid pulses. No carotid bruit or JVD.      Trachea: Trachea normal.   Cardiovascular:      Rate and Rhythm: Normal rate and regular rhythm.      Pulses: Normal pulses.      Heart sounds: Normal heart sounds. No murmur heard.    No friction rub. No gallop.   Pulmonary:      Effort: Pulmonary effort is normal.      Breath sounds: Normal breath sounds. No decreased breath sounds, wheezing, rhonchi or rales.   Abdominal:      General: Bowel sounds are normal.      Palpations: Abdomen is soft. There is no hepatomegaly.      Tenderness: There is no abdominal tenderness.   Musculoskeletal:      Cervical back: Neck supple.      Right lower leg: No edema.      Left lower leg: No edema.   Lymphadenopathy:      Cervical: No cervical adenopathy.   Skin:     General: Skin is warm.      Capillary Refill: Capillary refill takes less than 2 seconds.      Nails: There is no clubbing.   Neurological:      Mental Status: He is alert and oriented to person, place, and time.         PHQ4 = No data recorded    PERTINENT RESULTS:   Lab Visit on 12/28/2022   Component Date Value Ref Range Status    WBC 12/28/2022 7.35  3.90 - 12.70 K/uL Final    RBC 12/28/2022 4.97  4.60 - 6.20 M/uL " Final    Hemoglobin 12/28/2022 15.9  14.0 - 18.0 g/dL Final    Hematocrit 12/28/2022 45.6  40.0 - 54.0 % Final    MCV 12/28/2022 92  82 - 98 fL Final    MCH 12/28/2022 32.0 (H)  27.0 - 31.0 pg Final    MCHC 12/28/2022 34.9  32.0 - 36.0 g/dL Final    RDW 12/28/2022 12.1  11.5 - 14.5 % Final    Platelets 12/28/2022 296  150 - 450 K/uL Final    MPV 12/28/2022 9.8  9.2 - 12.9 fL Final    Sodium 12/28/2022 145  136 - 145 mmol/L Final    Potassium 12/28/2022 5.1  3.5 - 5.1 mmol/L Final    Chloride 12/28/2022 106  95 - 110 mmol/L Final    CO2 12/28/2022 31 (H)  23 - 29 mmol/L Final    Glucose 12/28/2022 111 (H)  70 - 110 mg/dL Final    BUN 12/28/2022 22 (H)  2 - 20 mg/dL Final    Creatinine 12/28/2022 1.16  0.50 - 1.40 mg/dL Final    Calcium 12/28/2022 9.2  8.7 - 10.5 mg/dL Final    Total Protein 12/28/2022 7.9  6.0 - 8.4 g/dL Final    Albumin 12/28/2022 5.0  3.5 - 5.2 g/dL Final    Total Bilirubin 12/28/2022 1.7 (H)  0.1 - 1.0 mg/dL Final    Comment: For infants and newborns, interpretation of results should be based  on gestational age, weight and in agreement with clinical  observations.    Premature Infant recommended reference ranges:  Up to 24 hours.............<8.0 mg/dL  Up to 48 hours............<12.0 mg/dL  3-5 days..................<15.0 mg/dL  6-29 days.................<15.0 mg/dL      Alkaline Phosphatase 12/28/2022 60  38 - 126 U/L Final    AST 12/28/2022 43  15 - 46 U/L Final    ALT 12/28/2022 52 (H)  10 - 44 U/L Final    Anion Gap 12/28/2022 8  8 - 16 mmol/L Final    eGFR 12/28/2022 >60.0  >60 mL/min/1.73 m^2 Final    Cholesterol 12/28/2022 202 (H)  120 - 199 mg/dL Final    Comment: The National Cholesterol Education Program (NCEP) has set the  following guidelines (reference ranges) for Cholesterol:  Optimal.....................<200 mg/dL  Borderline High.............200-239 mg/dL  High........................> or = 240 mg/dL      Triglycerides 12/28/2022 105  30 - 150 mg/dL Final    Comment: The National  Cholesterol Education Program (NCEP) has set the  following guidelines (reference values) for triglycerides:  Normal......................<150 mg/dL  Borderline High.............150-199 mg/dL  High........................200-499 mg/dL      HDL 12/28/2022 49  40 - 75 mg/dL Final    Comment: The National Cholesterol Education Program (NCEP) has set the  following guidelines (reference values) for HDL Cholesterol:  Low...............<40 mg/dL  Optimal...........>60 mg/dL      LDL Cholesterol 12/28/2022 132.0  63.0 - 159.0 mg/dL Final    Comment: The National Cholesterol Education Program (NCEP) has set the  following guidelines (reference values) for LDL Cholesterol:  Optimal.......................<130 mg/dL  Borderline High...............130-159 mg/dL  High..........................160-189 mg/dL  Very High.....................>190 mg/dL      HDL/Cholesterol Ratio 12/28/2022 24.3  20.0 - 50.0 % Final    Total Cholesterol/HDL Ratio 12/28/2022 4.1  2.0 - 5.0 Final    Non-HDL Cholesterol 12/28/2022 153  mg/dL Final    Comment: Risk category and Non-HDL cholesterol goals:  Coronary heart disease (CHD)or equivalent (10-year risk of CHD >20%):  Non-HDL cholesterol goal     <130 mg/dL  Two or more CHD risk factors and 10-year risk of CHD <= 20%:  Non-HDL cholesterol goal     <160 mg/dL  0 to 1 CHD risk factor:  Non-HDL cholesterol goal     <190 mg/dL      PSA, Screen 12/28/2022 0.88  0.00 - 4.00 ng/mL Final    Comment: The testing method is a chemiluminescent microparticle immunoassay   manufactured by [employer] and performed on the    or   [employer] system. Values obtained with different assay manufacturers   for   methods may be different and cannot be used interchangeably.  PSA Expected levels:  Hormonal Therapy: <0.05 ng/ml  Prostatectomy: <0.01 ng/ml  Radiation Therapy: <1.00 ng/ml      Hemoglobin A1C 12/28/2022 4.8  4.0 - 5.6 % Final    Comment: ADA Screening Guidelines:  5.7-6.4%  Consistent with  prediabetes  >or=6.5%  Consistent with diabetes    High levels of fetal hemoglobin interfere with the HbA1C  assay. Heterozygous hemoglobin variants (HbS, HgC, etc)do  not significantly interfere with this assay.   However, presence of multiple variants may affect accuracy.      Estimated Avg Glucose 12/28/2022 91  68 - 131 mg/dL Final                                                                                                                         ASSESSMENT/PLAN:  1. Encounter for general adult medical examination with abnormal findings  Overview:  - preventative health counseling  - counseling on current recommendations for colon cancer screening.  Up-to-date with history of colon polyps on colonoscopy due 2024  - counseling on current recommendations for shared decision making in prostate cancer screening.  He opts for yearly PSA testing.  Asymptomatic  - Vaccines recommended at this visit:  See below        2. Elevated bilirubin  Overview:  - chronic  - asymptomatic  - stable      3. History of colon polyps  Overview:  - followed by GI Dr. Malcolm Kinsey  - 2/27/2019 colonoscopy 1 polyp removed and recommended to follow-up 5 years  - next colonoscopy due 2024      4. Family history of coronary artery disease in brother  Overview:  - brother MI at 60 years old      5. Left varicocele  Overview:  - evaluated by urology   - 3/19/2021 ultrasound scrotum/testicles: 6 mm septated right epididymal head cyst.Left-sided varicocele      Other orders  -     Influenza - Quadrivalent (PF)      ORDERS:   Orders Placed This Encounter    Influenza - Quadrivalent (PF)       Vaccines recommended: flu and covid-19     Follow-up in 1 year or sooner if any concerns.      This note is dictated using the M*Modal Fluency Direct word recognition program. There are word recognition mistakes that are occasionally missed on review.    Dr. Gaye Martin D.O.   Family Medicine

## 2023-04-10 PROBLEM — Z00.01 ENCOUNTER FOR GENERAL ADULT MEDICAL EXAMINATION WITH ABNORMAL FINDINGS: Status: RESOLVED | Noted: 2023-01-03 | Resolved: 2023-04-10

## 2023-04-17 ENCOUNTER — PATIENT MESSAGE (OUTPATIENT)
Dept: PRIMARY CARE CLINIC | Facility: CLINIC | Age: 58
End: 2023-04-17
Payer: COMMERCIAL

## 2023-04-17 ENCOUNTER — OFFICE VISIT (OUTPATIENT)
Dept: PRIMARY CARE CLINIC | Facility: CLINIC | Age: 58
End: 2023-04-17
Attending: FAMILY MEDICINE
Payer: COMMERCIAL

## 2023-04-17 VITALS
HEART RATE: 79 BPM | HEIGHT: 73 IN | OXYGEN SATURATION: 99 % | DIASTOLIC BLOOD PRESSURE: 81 MMHG | BODY MASS INDEX: 26.55 KG/M2 | SYSTOLIC BLOOD PRESSURE: 137 MMHG | RESPIRATION RATE: 14 BRPM | WEIGHT: 200.31 LBS

## 2023-04-17 DIAGNOSIS — Z82.49 FAMILY HISTORY OF CORONARY ARTERY DISEASE IN BROTHER: ICD-10-CM

## 2023-04-17 DIAGNOSIS — Z01.818 PREOPERATIVE EXAMINATION: Primary | ICD-10-CM

## 2023-04-17 PROCEDURE — 99214 PR OFFICE/OUTPT VISIT, EST, LEVL IV, 30-39 MIN: ICD-10-PCS | Mod: S$GLB,,, | Performed by: FAMILY MEDICINE

## 2023-04-17 PROCEDURE — 99999 PR PBB SHADOW E&M-EST. PATIENT-LVL IV: ICD-10-PCS | Mod: PBBFAC,,, | Performed by: FAMILY MEDICINE

## 2023-04-17 PROCEDURE — 99214 OFFICE O/P EST MOD 30 MIN: CPT | Mod: S$GLB,,, | Performed by: FAMILY MEDICINE

## 2023-04-17 PROCEDURE — 93010 EKG 12-LEAD: ICD-10-PCS | Mod: S$GLB,,, | Performed by: INTERNAL MEDICINE

## 2023-04-17 PROCEDURE — 99999 PR PBB SHADOW E&M-EST. PATIENT-LVL IV: CPT | Mod: PBBFAC,,, | Performed by: FAMILY MEDICINE

## 2023-04-17 PROCEDURE — 93005 ELECTROCARDIOGRAM TRACING: CPT | Mod: S$GLB,,, | Performed by: FAMILY MEDICINE

## 2023-04-17 PROCEDURE — 93010 ELECTROCARDIOGRAM REPORT: CPT | Mod: S$GLB,,, | Performed by: INTERNAL MEDICINE

## 2023-04-17 PROCEDURE — 93005 EKG 12-LEAD: ICD-10-PCS | Mod: S$GLB,,, | Performed by: FAMILY MEDICINE

## 2023-04-17 NOTE — PROGRESS NOTES
"FAMILY MEDICINE  [facility]  [facility] [place] [facility]    Reason for visit:   Chief Complaint   Patient presents with    Pre-op Exam         SUBJECTIVE: Marty Olivarez is a 57 y.o. male  - history of colon polyps and family history of coronary artery disease presents for preoperative exam    Pain management:  Dr. Freddy Porras  Urology:  Dr. Swartz   Cardiology: Dr. Rangel  Cardiologist Dr. Rangel  - saw him 2019 due to continue with brother MI. Reports that had stress testing and CCS and everything was "good"    Today he reports he is doing well.  He denies any concerns or complaints.     1. Preoperative evaluation     Surgery: cyst removal for right 5th toe cyst with rodrigue  Surgeon: Aren Meyer DPM  Date of Surgery: 4/21/23  Hospital: [facility]  Anesthesia: General/MAC    Prior surgeries: see below  Reactions to anesthesia: denies  Prior post-operative complications: denies          Review of Systems   All other systems reviewed and are negative.    HEALTH MAINTENANCE:   Health Maintenance   Topic Date Due    PROSTATE-SPECIFIC ANTIGEN  12/28/2023    TETANUS VACCINE  05/29/2027    Lipid Panel  12/28/2027    Hepatitis C Screening  Completed     Health Maintenance Topics with due status: Not Due       Topic Last Completion Date    TETANUS VACCINE 05/29/2017    Colorectal Cancer Screening 02/27/2019    PROSTATE-SPECIFIC ANTIGEN 12/28/2022    Hemoglobin A1c (Diabetic Prevention Screening) 12/28/2022    Lipid Panel 12/28/2022     Health Maintenance Due   Topic Date Due    COVID-19 Vaccine (1) Never done       HISTORY:   Past Medical History:   Diagnosis Date    Encounter for vasectomy 4/16/2021    Sterilization consult 5/29/2017    Urinary tract infection        Past Surgical History:   Procedure Laterality Date    VASECTOMY  04/16/2021       Family History   Problem Relation Age of Onset    Pulmonary embolism Mother         s/p knee surgery    Hyperlipidemia Father     Hypertension Father     Lupus Sister     Stroke Sister  "    Heart disease Brother 60        MI LAD occlusion    No Known Problems Brother     No Known Problems Daughter     No Known Problems Daughter     Prostate cancer Neg Hx     Kidney disease Neg Hx        Social History     Tobacco Use    Smoking status: Former     Types: Cigarettes     Quit date:      Years since quittin.3     Passive exposure: Past    Smokeless tobacco: Never   Substance Use Topics    Alcohol use: Yes     Alcohol/week: 6.0 standard drinks     Types: 6 Cans of beer per week     Comment: 2-3 times/weekly     Drug use: No       Social History     Social History Narrative    . 2 daughters ( 27 yo and 21 yo) Cares for his mother who lives with him. Older daughter expecting first son is few weeks (she lost a pregnancy 2022). 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        ALLERGIES:   Review of patient's allergies indicates:  No Known Allergies    MEDS:     Current Outpatient Medications:     diazePAM (VALIUM) 10 MG Tab, Take 1 tablet (10 mg total) by mouth On call Procedure (Vasectomy)., Disp: 1 tablet, Rfl: 0    HYDROcodone-acetaminophen (NORCO) 5-325 mg per tablet, Take 1 tablet by mouth every 6 (six) hours as needed for Pain. (Patient not taking: Reported on 1/3/2023), Disp: 15 tablet, Rfl: 0    Current Facility-Administered Medications:     acetaminophen tablet 650 mg, 650 mg, Oral, Once PRN, Gaye Martin DO    albuterol inhaler 2 puff, 2 puff, Inhalation, Q20 Min PRN, Gaye Martin, DO    diphenhydrAMINE injection 25 mg, 25 mg, Intravenous, Once PRN, Gaye Martin DO    EPINEPHrine (EPIPEN) 0.3 mg/0.3 mL pen injection 0.3 mg, 0.3 mg, Intramuscular, PRN, Gaye Martin, DO    methylPREDNISolone sodium succinate injection 40 mg, 40 mg, Intravenous, Once PRN, Gaye Martin DO    ondansetron disintegrating tablet 4 mg, 4 mg, Oral, Once PRN, Gaye Martin, DO    sodium chloride 0.9% 500 mL flush bag, , Intravenous, PRN, Gaye Martin, DO    sodium chloride 0.9% flush  "10 mL, 10 mL, Intravenous, PRN, Gaye Martin, DO      Vital signs:   Vitals:    04/17/23 1011   BP: 137/81   Pulse: 79   Resp: 14   SpO2: 99%   Weight: 90.9 kg (200 lb 4.6 oz)   Height: 6' 1" (1.854 m)       Body mass index is 26.42 kg/m².  PHYSICAL EXAM:     Physical Exam  Constitutional:       General: He is not in acute distress.  Cardiovascular:      Rate and Rhythm: Normal rate and regular rhythm.      Heart sounds: Normal heart sounds. No murmur heard.    No friction rub. No gallop.   Pulmonary:      Effort: Pulmonary effort is normal.      Breath sounds: Normal breath sounds. No wheezing, rhonchi or rales.   Abdominal:      General: Bowel sounds are normal.      Palpations: Abdomen is soft.      Hernia: A hernia (nontender, reducible) is present. Hernia is present in the umbilical area.   Musculoskeletal:      Cervical back: Neck supple.      Right lower leg: No edema.      Left lower leg: No edema.   Skin:     General: Skin is warm.   Neurological:      Mental Status: He is alert.         PHQ4 = No data recorded    PERTINENT RESULTS:   No visits with results within 1 Week(s) from this visit.   Latest known visit with results is:   Lab Visit on 12/28/2022   Component Date Value Ref Range Status    WBC 12/28/2022 7.35  3.90 - 12.70 K/uL Final    RBC 12/28/2022 4.97  4.60 - 6.20 M/uL Final    Hemoglobin 12/28/2022 15.9  14.0 - 18.0 g/dL Final    Hematocrit 12/28/2022 45.6  40.0 - 54.0 % Final    MCV 12/28/2022 92  82 - 98 fL Final    MCH 12/28/2022 32.0 (H)  27.0 - 31.0 pg Final    MCHC 12/28/2022 34.9  32.0 - 36.0 g/dL Final    RDW 12/28/2022 12.1  11.5 - 14.5 % Final    Platelets 12/28/2022 296  150 - 450 K/uL Final    MPV 12/28/2022 9.8  9.2 - 12.9 fL Final    Sodium 12/28/2022 145  136 - 145 mmol/L Final    Potassium 12/28/2022 5.1  3.5 - 5.1 mmol/L Final    Chloride 12/28/2022 106  95 - 110 mmol/L Final    CO2 12/28/2022 31 (H)  23 - 29 mmol/L Final    Glucose 12/28/2022 111 (H)  70 - 110 mg/dL Final    " BUN 12/28/2022 22 (H)  2 - 20 mg/dL Final    Creatinine 12/28/2022 1.16  0.50 - 1.40 mg/dL Final    Calcium 12/28/2022 9.2  8.7 - 10.5 mg/dL Final    Total Protein 12/28/2022 7.9  6.0 - 8.4 g/dL Final    Albumin 12/28/2022 5.0  3.5 - 5.2 g/dL Final    Total Bilirubin 12/28/2022 1.7 (H)  0.1 - 1.0 mg/dL Final    Comment: For infants and newborns, interpretation of results should be based  on gestational age, weight and in agreement with clinical  observations.    Premature Infant recommended reference ranges:  Up to 24 hours.............<8.0 mg/dL  Up to 48 hours............<12.0 mg/dL  3-5 days..................<15.0 mg/dL  6-29 days.................<15.0 mg/dL      Alkaline Phosphatase 12/28/2022 60  38 - 126 U/L Final    AST 12/28/2022 43  15 - 46 U/L Final    ALT 12/28/2022 52 (H)  10 - 44 U/L Final    Anion Gap 12/28/2022 8  8 - 16 mmol/L Final    eGFR 12/28/2022 >60.0  >60 mL/min/1.73 m^2 Final    Cholesterol 12/28/2022 202 (H)  120 - 199 mg/dL Final    Comment: The National Cholesterol Education Program (NCEP) has set the  following guidelines (reference ranges) for Cholesterol:  Optimal.....................<200 mg/dL  Borderline High.............200-239 mg/dL  High........................> or = 240 mg/dL      Triglycerides 12/28/2022 105  30 - 150 mg/dL Final    Comment: The National Cholesterol Education Program (NCEP) has set the  following guidelines (reference values) for triglycerides:  Normal......................<150 mg/dL  Borderline High.............150-199 mg/dL  High........................200-499 mg/dL      HDL 12/28/2022 49  40 - 75 mg/dL Final    Comment: The National Cholesterol Education Program (NCEP) has set the  following guidelines (reference values) for HDL Cholesterol:  Low...............<40 mg/dL  Optimal...........>60 mg/dL      LDL Cholesterol 12/28/2022 132.0  63.0 - 159.0 mg/dL Final    Comment: The National Cholesterol Education Program (NCEP) has set the  following guidelines  (reference values) for LDL Cholesterol:  Optimal.......................<130 mg/dL  Borderline High...............130-159 mg/dL  High..........................160-189 mg/dL  Very High.....................>190 mg/dL      HDL/Cholesterol Ratio 12/28/2022 24.3  20.0 - 50.0 % Final    Total Cholesterol/HDL Ratio 12/28/2022 4.1  2.0 - 5.0 Final    Non-HDL Cholesterol 12/28/2022 153  mg/dL Final    Comment: Risk category and Non-HDL cholesterol goals:  Coronary heart disease (CHD)or equivalent (10-year risk of CHD >20%):  Non-HDL cholesterol goal     <130 mg/dL  Two or more CHD risk factors and 10-year risk of CHD <= 20%:  Non-HDL cholesterol goal     <160 mg/dL  0 to 1 CHD risk factor:  Non-HDL cholesterol goal     <190 mg/dL      PSA, Screen 12/28/2022 0.88  0.00 - 4.00 ng/mL Final    Comment: The testing method is a chemiluminescent microparticle immunoassay   manufactured by [employer] and performed on the    or   [employer] system. Values obtained with different assay manufacturers   for   methods may be different and cannot be used interchangeably.  PSA Expected levels:  Hormonal Therapy: <0.05 ng/ml  Prostatectomy: <0.01 ng/ml  Radiation Therapy: <1.00 ng/ml      Hemoglobin A1C 12/28/2022 4.8  4.0 - 5.6 % Final    Comment: ADA Screening Guidelines:  5.7-6.4%  Consistent with prediabetes  >or=6.5%  Consistent with diabetes    High levels of fetal hemoglobin interfere with the HbA1C  assay. Heterozygous hemoglobin variants (HbS, HgC, etc)do  not significantly interfere with this assay.   However, presence of multiple variants may affect accuracy.      Estimated Avg Glucose 12/28/2022 91  68 - 131 mg/dL Final         ASSESSMENT/PLAN:  1. Preoperative examination  Overview:  Preoperative Assessment:  Cardiovascular risk assessment:  Non-emergent surgery:   Surgery risk: low  Functional status: >8 Mets  Medically optimized.   No active cardiopulmonary issues.    Recommendations:  1. Okay to proceed with  surgery      Orders:  -     EKG 12-lead    2. Family history of coronary artery disease in brother  Overview:  - brother MI at 60 years old    Orders:  -     EKG 12-lead        ORDERS:   Orders Placed This Encounter    EKG 12-lead       Vaccines recommended: covid-19     Follow-up PRN .      This note is dictated using the M*Modal Fluency Direct word recognition program. There are word recognition mistakes that are occasionally missed on review.    Dr. Gaye Martin D.O.   Family Medicine

## 2023-06-05 ENCOUNTER — PATIENT MESSAGE (OUTPATIENT)
Dept: PRIMARY CARE CLINIC | Facility: CLINIC | Age: 58
End: 2023-06-05
Payer: COMMERCIAL

## 2023-06-05 DIAGNOSIS — K43.9 HERNIA OF ABDOMINAL WALL: Primary | ICD-10-CM

## 2023-06-28 ENCOUNTER — OFFICE VISIT (OUTPATIENT)
Dept: SURGERY | Facility: CLINIC | Age: 58
End: 2023-06-28
Payer: COMMERCIAL

## 2023-06-28 VITALS
BODY MASS INDEX: 26.66 KG/M2 | HEART RATE: 67 BPM | HEIGHT: 73 IN | DIASTOLIC BLOOD PRESSURE: 84 MMHG | SYSTOLIC BLOOD PRESSURE: 137 MMHG | WEIGHT: 201.19 LBS

## 2023-06-28 DIAGNOSIS — K42.9 UMBILICAL HERNIA WITHOUT OBSTRUCTION AND WITHOUT GANGRENE: ICD-10-CM

## 2023-06-28 PROCEDURE — 99999 PR PBB SHADOW E&M-EST. PATIENT-LVL IV: CPT | Mod: PBBFAC,,, | Performed by: SURGERY

## 2023-06-28 PROCEDURE — 99999 PR PBB SHADOW E&M-EST. PATIENT-LVL IV: ICD-10-PCS | Mod: PBBFAC,,, | Performed by: SURGERY

## 2023-06-28 PROCEDURE — 99204 OFFICE O/P NEW MOD 45 MIN: CPT | Mod: S$GLB,,, | Performed by: SURGERY

## 2023-06-28 PROCEDURE — 99204 PR OFFICE/OUTPT VISIT, NEW, LEVL IV, 45-59 MIN: ICD-10-PCS | Mod: S$GLB,,, | Performed by: SURGERY

## 2023-06-28 RX ORDER — CEFAZOLIN SODIUM 2 G/50ML
2 SOLUTION INTRAVENOUS
Status: CANCELLED | OUTPATIENT
Start: 2023-06-28

## 2023-06-28 NOTE — PROGRESS NOTES
GENERAL SURGERY CLINIC H&P    Subjective:     Marty Olivarez is a 57 y.o. male with no PMH who presents to clinic for evaluation of umbilical hernia. Patient reports that he has noted a bulge in his umbilicus for the last year. Notes that it has been growing in size slowly. Is always able to reduce the hernia. Denies nausea, vomiting, constipation, and skin changes over the hernia. Has never had any abdominal surgeries.       PMH:   Past Medical History:   Diagnosis Date    Encounter for vasectomy 2021    Sterilization consult 2017    Urinary tract infection        Past Surgical History:   Past Surgical History:   Procedure Laterality Date    VASECTOMY  2021       Social History:  Social History     Socioeconomic History    Marital status:     Number of children: 2   Occupational History     Employer: [employer]   Tobacco Use    Smoking status: Former     Types: Cigarettes     Quit date:      Years since quittin.5     Passive exposure: Past    Smokeless tobacco: Never   Substance and Sexual Activity    Alcohol use: Yes     Alcohol/week: 6.0 standard drinks     Types: 6 Cans of beer per week     Comment: 2-3 times/weekly     Drug use: No    Sexual activity: Yes     Partners: Female   Social History Narrative    . 2 daughters ( 27 yo and 21 yo) Cares for his mother who lives with him. Older daughter expecting first son is few weeks (she lost a pregnancy 2022). 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        Allergies: Review of patient's allergies indicates:  No Known Allergies    Medications:  Current Outpatient Medications on File Prior to Visit   Medication Sig Dispense Refill    diazePAM (VALIUM) 10 MG Tab Take 1 tablet (10 mg total) by mouth On call Procedure (Vasectomy). 1 tablet 0    HYDROcodone-acetaminophen (NORCO) 5-325 mg per tablet Take 1 tablet by mouth every 6 (six) hours as needed for Pain. (Patient not taking: Reported on 2023) 15  tablet 0     Current Facility-Administered Medications on File Prior to Visit   Medication Dose Route Frequency Provider Last Rate Last Admin    acetaminophen tablet 650 mg  650 mg Oral Once PRN Gaye Bourneo, DO        albuterol inhaler 2 puff  2 puff Inhalation Q20 Min PRN Gaye Bourneo, DO        diphenhydrAMINE injection 25 mg  25 mg Intravenous Once PRN Gaye Bourneo, DO        EPINEPHrine (EPIPEN) 0.3 mg/0.3 mL pen injection 0.3 mg  0.3 mg Intramuscular PRN Gaye Bourneo, DO        methylPREDNISolone sodium succinate injection 40 mg  40 mg Intravenous Once PRN Gaye FLORES Veronica, DO        ondansetron disintegrating tablet 4 mg  4 mg Oral Once PRN Gaye FLORES Veronica, DO        sodium chloride 0.9% 500 mL flush bag   Intravenous PRN Gaye Bourneo, DO        sodium chloride 0.9% flush 10 mL  10 mL Intravenous PRN Gaye Martin, DO             Objective:     Vital Signs (Most Recent)  Pulse: 67 (06/28/23 0914)  BP: 137/84 (06/28/23 0914)    ROS A 10+ review of systems was performed with pertinent positives and negatives noted above in the history of present illness.  Other systems were negative unless otherwise specified.    Physical Exam:  Gen: awake, alert, in no acute distress  HEENT: normocephalic, atraumatic, EOMI, no scleral icterus  CV: regular rate and rhythm  Pulm: equal chest rise bilaterally, normal work of breathing  Abd:  soft, non-tender, no guarding   Reducible 1 cm umbilical hernia  Ext: WWP, skin warm and dry      Assessment:     Marty Olivarez is a 57 y.o. male with reducible umbilical hernia.    Plan:     - Plan for open repair of umbilical hernia  - Obtain PCP clearance  - Risks and benefits of procedure discussed with patient. All questions answered  - Consent signed in clinic      Alfie Boogie MD   [facility] PGY-1    I have personally taken the history and examined this patient and agree with the resident's note as stated above.         Barrett Torre MD

## 2023-07-13 ENCOUNTER — TELEPHONE (OUTPATIENT)
Dept: PRIMARY CARE CLINIC | Facility: CLINIC | Age: 58
End: 2023-07-13
Payer: COMMERCIAL

## 2023-07-13 NOTE — TELEPHONE ENCOUNTER
----- Message from Shelbi Ritter RN sent at 7/13/2023  3:46 PM CDT -----  Hi-    Pt is scheduled for an open umbilical hernia repair under general anesthesia with Dr. Torre on 8/31.  Prior to proceeding, he is requesting that pt is cleared from a medical standpoint.    Please advise.    Thanks,  Shelbi

## 2023-07-13 NOTE — TELEPHONE ENCOUNTER
Called pt and stated that  is fully booked. I offered him a call back on Monday when  is here so I can see where he can be placed on the schedule. Pt agreed

## 2023-07-20 ENCOUNTER — PATIENT MESSAGE (OUTPATIENT)
Dept: PRIMARY CARE CLINIC | Facility: CLINIC | Age: 58
End: 2023-07-20
Payer: COMMERCIAL

## 2023-08-09 ENCOUNTER — PATIENT MESSAGE (OUTPATIENT)
Dept: PRIMARY CARE CLINIC | Facility: CLINIC | Age: 58
End: 2023-08-09
Payer: COMMERCIAL

## 2023-08-09 DIAGNOSIS — Z01.818 PREOPERATIVE EXAMINATION: Primary | ICD-10-CM

## 2023-08-14 ENCOUNTER — PATIENT MESSAGE (OUTPATIENT)
Dept: SURGERY | Facility: HOSPITAL | Age: 58
End: 2023-08-14
Payer: COMMERCIAL

## 2023-08-15 NOTE — TELEPHONE ENCOUNTER
Yes and EKG. Please schedule in [place] prior to visti 8/17/23    Orders Placed This Encounter    Basic Metabolic Panel    CBC Auto Differential    SCHEDULED EKG 12-LEAD (to [place])     Dr. Gaye Martin D.O.   [facility]

## 2023-08-16 ENCOUNTER — PATIENT MESSAGE (OUTPATIENT)
Dept: PRIMARY CARE CLINIC | Facility: CLINIC | Age: 58
End: 2023-08-16
Payer: COMMERCIAL

## 2023-08-16 DIAGNOSIS — D50.9 MICROCYTIC ANEMIA: Primary | ICD-10-CM

## 2023-08-16 NOTE — TELEPHONE ENCOUNTER
Please call [facility] and see if they can add on iron and ferritin to labs collected 8/15/23    Orders Placed This Encounter    Ferritin    Iron and TIBC       Dr. Gaye Martin D.O.   [facility]

## 2023-08-16 NOTE — PROGRESS NOTES
"FAMILY MEDICINE  [facility]    Reason for visit:   Chief Complaint   Patient presents with    Pre-op Exam         SUBJECTIVE: Marty Olivarez is a 57 y.o. male  - history of colon polyps and family history of coronary artery disease presents for preoperative exam    Pain management:  Dr. Freddy Porras  Urology:  Dr. Swartz   Cardiology: Dr. Rangel  Cardiologist Dr. Rangel  - saw him 2019 due to continue with brother MI. Reports that had stress testing and CCS and everything was "good"    Today he reports he is doing well.  He denies any concerns or complaints.  He had a  decrease in hemoglobin and hematocrit without prior issues avenue.  He denies any symptoms of bleeding, fatigue, shortness of breath, chest pain, hematuria, melena, hematochezia or unintentional his last colonoscopy was 02/27/2019 and polyp was removed.  He is scheduled for repeat colonoscopy 2/2024    1. Preoperative evaluation     Surgery: umbilical hernia repair   Surgeon: Dr. Barrett Yeung  Date of Surgery: 8/31/23  [place] [facility] [place]esthesia: General/MAC    Prior surgeries: see below  Reactions to anesthesia: denies  Prior post-operative complications: denies            Review of Systems   Respiratory:  Positive for cough (on and off several months).    All other systems reviewed and are negative.      HEALTH MAINTENANCE:   Health Maintenance   Topic Date Due    PROSTATE-SPECIFIC ANTIGEN  12/28/2023    Colorectal Cancer Screening  02/27/2024    TETANUS VACCINE  05/29/2027    Lipid Panel  12/28/2027    Hepatitis C Screening  Completed    Shingles Vaccine  Completed     Health Maintenance Topics with due status: Not Due       Topic Last Completion Date    TETANUS VACCINE 05/29/2017    Colorectal Cancer Screening 02/27/2019    PROSTATE-SPECIFIC ANTIGEN 12/28/2022    Hemoglobin A1c (Diabetic Prevention Screening) 12/28/2022    Lipid Panel 12/28/2022    Influenza Vaccine 01/03/2023     Health Maintenance Due   Topic Date Due " "   COVID-19 Vaccine (1) Never done    Pneumococcal Vaccines (Age 0-64) (1 - PCV) Never done       HISTORY:   Past Medical History:   Diagnosis Date    Encounter for vasectomy 2021    Sterilization consult 2017    Urinary tract infection        Past Surgical History:   Procedure Laterality Date    FOOT SURGERY Right 2023    cyst removal    KIDNEY TRANSPLANT      VASECTOMY  2021       Family History   Problem Relation Age of Onset    Pulmonary embolism Mother         s/p knee surgery    Hyperlipidemia Father     Hypertension Father     Lupus Sister     Stroke Sister     Heart disease Brother 60        MI LAD occlusion    No Known Problems Brother     No Known Problems Daughter     No Known Problems Daughter     Prostate cancer Neg Hx     Kidney disease Neg Hx        Social History     Tobacco Use    Smoking status: Former     Current packs/day: 0.00     Types: Cigarettes     Quit date:      Years since quittin.6     Passive exposure: Past    Smokeless tobacco: Never   Substance Use Topics    Alcohol use: Yes     Alcohol/week: 6.0 standard drinks of alcohol     Types: 6 Cans of beer per week     Comment: 2-3 times/weekly     Drug use: No       Social History     Social History Narrative    . 2 daughters ( 28 yo and 24 yo) Cares for his mother who lives with him. Older daughter expecting first son is few weeks (she lost a pregnancy 2022) and gave birth to a boy 2023. 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        ALLERGIES:   Review of patient's allergies indicates:  No Known Allergies    MEDS:   No current outpatient medications on file.  No current facility-administered medications for this visit.      Vital signs:   Vitals:    23 0852   BP: 122/88   Pulse: 72   SpO2: 99%   Weight: 93.6 kg (206 lb 7.4 oz)   Height: 6' 1" (1.854 m)         Body mass index is 27.24 kg/m².  PHYSICAL EXAM:     Physical Exam  Constitutional:       General: He is not in " acute distress.  Cardiovascular:      Rate and Rhythm: Normal rate and regular rhythm.      Heart sounds: Normal heart sounds. No murmur heard.     No friction rub. No gallop.   Pulmonary:      Effort: Pulmonary effort is normal.      Breath sounds: Normal breath sounds. No wheezing, rhonchi or rales.   Abdominal:      General: Bowel sounds are normal. There is no distension.      Palpations: Abdomen is soft. There is no mass.      Tenderness: There is no abdominal tenderness. There is no guarding or rebound.      Hernia: Hernia: nontender, reducible.   Musculoskeletal:      Cervical back: Neck supple.      Right lower leg: No edema.      Left lower leg: No edema.   Skin:     General: Skin is warm.   Neurological:      Mental Status: He is alert.             PERTINENT RESULTS:   Lab Visit on 08/15/2023   Component Date Value Ref Range Status    Sodium 08/15/2023 140  136 - 145 mmol/L Final    Potassium 08/15/2023 3.8  3.5 - 5.1 mmol/L Final    Chloride 08/15/2023 107  95 - 110 mmol/L Final    CO2 08/15/2023 24  23 - 29 mmol/L Final    Glucose 08/15/2023 87  70 - 110 mg/dL Final    BUN 08/15/2023 14  2 - 20 mg/dL Final    Creatinine 08/15/2023 1.10  0.50 - 1.40 mg/dL Final    Calcium 08/15/2023 8.5 (L)  8.7 - 10.5 mg/dL Final    Anion Gap 08/15/2023 9  8 - 16 mmol/L Final    eGFR 08/15/2023 >60.0  >60 mL/min/1.73 m^2 Final    WBC 08/15/2023 6.85  3.90 - 12.70 K/uL Final    RBC 08/15/2023 4.18 (L)  4.60 - 6.20 M/uL Final    Hemoglobin 08/15/2023 13.4 (L)  14.0 - 18.0 g/dL Final    Hematocrit 08/15/2023 37.9 (L)  40.0 - 54.0 % Final    MCV 08/15/2023 91  82 - 98 fL Final    MCH 08/15/2023 32.1 (H)  27.0 - 31.0 pg Final    MCHC 08/15/2023 35.4  32.0 - 36.0 g/dL Final    RDW 08/15/2023 12.0  11.5 - 14.5 % Final    Platelets 08/15/2023 291  150 - 450 K/uL Final    MPV 08/15/2023 10.3  9.2 - 12.9 fL Final    Immature Granulocytes 08/15/2023 0.3  0.0 - 0.5 % Final    Gran # (ANC) 08/15/2023 4.3  1.8 - 7.7 K/uL Final     Immature Grans (Abs) 08/15/2023 0.02  0.00 - 0.04 K/uL Final    Comment: Mild elevation in immature granulocytes is non specific and   can be seen in a variety of conditions including stress response,   acute inflammation, trauma and pregnancy. Correlation with other   laboratory and clinical findings is essential.      Lymph # 08/15/2023 1.7  1.0 - 4.8 K/uL Final    Mono # 08/15/2023 0.5  0.3 - 1.0 K/uL Final    Eos # 08/15/2023 0.3  0.0 - 0.5 K/uL Final    Baso # 08/15/2023 0.07  0.00 - 0.20 K/uL Final    nRBC 08/15/2023 0  0 /100 WBC Final    Gran % 08/15/2023 62.8  38.0 - 73.0 % Final    Lymph % 08/15/2023 24.2  18.0 - 48.0 % Final    Mono % 08/15/2023 7.9  4.0 - 15.0 % Final    Eosinophil % 08/15/2023 3.8  0.0 - 8.0 % Final    Basophil % 08/15/2023 1.0  0.0 - 1.9 % Final    Differential Method 08/15/2023 Automated   Final     Results for orders placed or performed in visit on 08/15/23   SCHEDULED EKG 12-LEAD (to Muse)    Collection Time: 08/15/23 12:39 PM    Narrative    Test Reason : Z01.818,    Vent. Rate : 073 BPM     Atrial Rate : 073 BPM     P-R Int : 154 ms          QRS Dur : 080 ms      QT Int : 412 ms       P-R-T Axes : -03 121 -47 degrees     QTc Int : 453 ms    Normal sinus rhythm with sinus arrhythmia  Right axis deviation  Nonspecific T wave abnormality  Abnormal ECG  When compared with ECG of 17-APR-2023 10:32,  The axis Shifted right  Non-specific change in ST segment in Anterior leads  Confirmed by Joel Hanna III, MD ([id number]) on 8/15/2023 1:11:35 PM    Referred By: YEE KEARNEY           Confirmed By:Joel Hanna III, MD     X-Ray Chest PA And Lateral  Narrative: EXAMINATION:  XR CHEST PA AND LATERAL    CLINICAL HISTORY:  Encounter for other preprocedural examination    TECHNIQUE:  PA and lateral views of the chest were performed.    COMPARISON:  08/27/2021    FINDINGS:  The cardiomediastinal contour is within normal limits.  The lungs are clear.  No pneumothorax.  No pleural  effusions.  Impression: No acute findings.    Electronically signed by: Mariusz Cobb MD  Date:    08/17/2023  Time:    10:13        ASSESSMENT/PLAN:  1. Preoperative examination  Overview:  Preoperative Assessment:  Cardiovascular risk assessment:  Non-emergent surgery:   Surgery risk: low  Functional status: >8 Mets  Medically optimized.   No active cardiopulmonary issues.    Recommendations:  1. Okay to proceed with surgery      Orders:  -     X-Ray Chest PA And Lateral; Future; Expected date: 08/17/2023    2. Normocytic anemia  Overview:  Lab Results   Component Value Date    WBC 6.85 08/15/2023    HGB 13.4 (L) 08/15/2023    HCT 37.9 (L) 08/15/2023    MCV 91 08/15/2023     08/15/2023     - asymptomatic and no prior issues  - recommend repeat H&H with iron, TIBC and ferritin next week unless significant further decrease will not exclude him surgery however will need further evaluation    Orders:  -     CBC Auto Differential; Future; Expected date: 08/22/2023  -     Iron and TIBC; Future; Expected date: 08/22/2023  -     Ferritin; Future; Expected date: 08/22/2023    3. History of colon polyps  Overview:  - followed by GI Dr. Malcolm Kinsey  - 2/27/2019 colonoscopy 1 polyp removed and recommended to follow-up 5 years  - next colonoscopy due 2024          ORDERS:   Orders Placed This Encounter    X-Ray Chest PA And Lateral    CBC Auto Differential    Iron and TIBC    Ferritin         Vaccines recommended: covid-19     Follow-up 4 months annual or sooner with any concerns      This note is dictated using the M*Modal Fluency Direct word recognition program. There are word recognition mistakes that are occasionally missed on review.    Dr. Gaye Martin D.O.   Family Medicine

## 2023-08-17 ENCOUNTER — OFFICE VISIT (OUTPATIENT)
Dept: PRIMARY CARE CLINIC | Facility: CLINIC | Age: 58
End: 2023-08-17
Attending: FAMILY MEDICINE
Payer: COMMERCIAL

## 2023-08-17 ENCOUNTER — APPOINTMENT (OUTPATIENT)
Dept: RADIOLOGY | Facility: OTHER | Age: 58
End: 2023-08-17
Attending: FAMILY MEDICINE
Payer: COMMERCIAL

## 2023-08-17 ENCOUNTER — TELEPHONE (OUTPATIENT)
Dept: SURGERY | Facility: CLINIC | Age: 58
End: 2023-08-17
Payer: COMMERCIAL

## 2023-08-17 VITALS
OXYGEN SATURATION: 99 % | BODY MASS INDEX: 27.36 KG/M2 | HEART RATE: 72 BPM | HEIGHT: 73 IN | DIASTOLIC BLOOD PRESSURE: 88 MMHG | SYSTOLIC BLOOD PRESSURE: 122 MMHG | WEIGHT: 206.44 LBS

## 2023-08-17 DIAGNOSIS — D64.9 NORMOCYTIC ANEMIA: ICD-10-CM

## 2023-08-17 DIAGNOSIS — Z01.818 PREOPERATIVE EXAMINATION: ICD-10-CM

## 2023-08-17 DIAGNOSIS — Z86.010 HISTORY OF COLON POLYPS: ICD-10-CM

## 2023-08-17 DIAGNOSIS — Z01.818 PREOPERATIVE EXAMINATION: Primary | ICD-10-CM

## 2023-08-17 PROCEDURE — 99214 PR OFFICE/OUTPT VISIT, EST, LEVL IV, 30-39 MIN: ICD-10-PCS | Mod: S$GLB,,, | Performed by: FAMILY MEDICINE

## 2023-08-17 PROCEDURE — 71046 XR CHEST PA AND LATERAL: ICD-10-PCS | Mod: 26,,, | Performed by: RADIOLOGY

## 2023-08-17 PROCEDURE — 99999 PR PBB SHADOW E&M-EST. PATIENT-LVL III: CPT | Mod: PBBFAC,,, | Performed by: FAMILY MEDICINE

## 2023-08-17 PROCEDURE — 71046 X-RAY EXAM CHEST 2 VIEWS: CPT | Mod: TC

## 2023-08-17 PROCEDURE — 99214 OFFICE O/P EST MOD 30 MIN: CPT | Mod: S$GLB,,, | Performed by: FAMILY MEDICINE

## 2023-08-17 PROCEDURE — 71046 X-RAY EXAM CHEST 2 VIEWS: CPT | Mod: 26,,, | Performed by: RADIOLOGY

## 2023-08-17 PROCEDURE — 99999 PR PBB SHADOW E&M-EST. PATIENT-LVL III: ICD-10-PCS | Mod: PBBFAC,,, | Performed by: FAMILY MEDICINE

## 2023-08-17 NOTE — Clinical Note
Clearance completed and okay to proceed. New mild anemia that will not affect surgery unless further decrease. I am repeating next week so if significant decrease I will let you know. I will follow it outpatient

## 2023-08-17 NOTE — TELEPHONE ENCOUNTER
----- Message from Gaye Martin, DO sent at 8/17/2023  9:23 AM CDT -----  Clearance completed and okay to proceed. New mild anemia that will not affect surgery unless further decrease. I am repeating next week so if significant decrease I will let you know. I will follow it outpatient

## 2023-08-22 ENCOUNTER — PATIENT MESSAGE (OUTPATIENT)
Dept: PRIMARY CARE CLINIC | Facility: CLINIC | Age: 58
End: 2023-08-22
Payer: COMMERCIAL

## 2023-08-22 DIAGNOSIS — Z12.5 SCREENING FOR PROSTATE CANCER: ICD-10-CM

## 2023-08-22 DIAGNOSIS — Z00.01 ENCOUNTER FOR GENERAL ADULT MEDICAL EXAMINATION WITH ABNORMAL FINDINGS: Primary | ICD-10-CM

## 2023-08-30 ENCOUNTER — TELEPHONE (OUTPATIENT)
Dept: SURGERY | Facility: CLINIC | Age: 58
End: 2023-08-30
Payer: COMMERCIAL

## 2023-08-30 ENCOUNTER — ANESTHESIA EVENT (OUTPATIENT)
Dept: SURGERY | Facility: HOSPITAL | Age: 58
End: 2023-08-30
Payer: COMMERCIAL

## 2023-08-30 NOTE — ANESTHESIA PREPROCEDURE EVALUATION
[facility]  Anesthesia Pre-Operative Evaluation         Patient Name: Marty Olivarez  YOB: 1965  MRN: [id number]    SUBJECTIVE:     Pre-operative evaluation for Procedure(s) (LRB):  REPAIR, HERNIA, UMBILICAL, OPEN w/ possible mesh (N/A)     2023    Marty Olivarez is a 57 y.o. male with no significant PMHx apart from past smoking and umbilical hernia.    Patient now presents for the above procedure(s).    Prev airway: None documented.      Patient Active Problem List   Diagnosis    Elevated bilirubin    History of colon polyps    Family history of coronary artery disease in brother    Left varicocele    Preoperative examination    Normocytic anemia       Review of patient's allergies indicates:  No Known Allergies    Current Inpatient Medications:      No current facility-administered medications on file prior to encounter.     No current outpatient medications on file prior to encounter.       Past Surgical History:   Procedure Laterality Date    FOOT SURGERY Right 2023    cyst removal    KIDNEY TRANSPLANT      VASECTOMY  2021       Social History     Socioeconomic History    Marital status:     Number of children: 2   Occupational History     Employer: [employer]   Tobacco Use    Smoking status: Former     Current packs/day: 0.00     Types: Cigarettes     Quit date:      Years since quittin.6     Passive exposure: Past    Smokeless tobacco: Never   Substance and Sexual Activity    Alcohol use: Yes     Alcohol/week: 6.0 standard drinks of alcohol     Types: 6 Cans of beer per week     Comment: 2-3 times/weekly     Drug use: No    Sexual activity: Yes     Partners: Female   Social History Narrative    . 2 daughters ( 28 yo and 22 yo) Cares for his mother who lives with him. Older daughter expecting first son is few weeks (she lost a pregnancy 2022) and gave birth to a boy 2023. Youngest working in sales. Daughters are  [FreeTextEntry1] : 1. Hypertension: Advised to get name and dosage of recently started medication. For now we'll continue advised to lose weight and maintain a low-salt diet\par 2. Fatigue/obstructive sleep apnea: Strongly advised to undergo a sleep study it is a likely cause of his fatigue as well as high hemoglobin. Will advise once results of blood work is known.\par 3. Cardiac murmur: Echocardiogram doing very well.        OBJECTIVE:     Vital Signs Range (Last 24H):         Significant Labs:  Lab Results   Component Value Date    WBC 5.42 08/22/2023    HGB 15.2 08/22/2023    HCT 43.6 08/22/2023     08/22/2023    CHOL 202 (H) 12/28/2022    TRIG 105 12/28/2022    HDL 49 12/28/2022    ALT 52 (H) 12/28/2022    AST 43 12/28/2022     08/15/2023    K 3.8 08/15/2023     08/15/2023    CREATININE 1.10 08/15/2023    BUN 14 08/15/2023    CO2 24 08/15/2023    PSA 0.88 12/28/2022    HGBA1C 4.8 12/28/2022       Diagnostic Studies: No relevant studies.    EKG:   Results for orders placed or performed in visit on 08/15/23   SCHEDULED EKG 12-LEAD (to Muse)    Collection Time: 08/15/23 12:39 PM    Narrative    Test Reason : Z01.818,    Vent. Rate : 073 BPM     Atrial Rate : 073 BPM     P-R Int : 154 ms          QRS Dur : 080 ms      QT Int : 412 ms       P-R-T Axes : -03 121 -47 degrees     QTc Int : 453 ms    Normal sinus rhythm with sinus arrhythmia  Right axis deviation  Nonspecific T wave abnormality  Abnormal ECG  When compared with ECG of 17-APR-2023 10:32,  The axis Shifted right  Non-specific change in ST segment in Anterior leads  Confirmed by Jacques FOWLER MD, Joel MAI ([id number]) on 8/15/2023 1:11:35 PM    Referred By: YEE KEARNEY           Confirmed By:Joel Hanna III, MD       2D ECHO:  TTE:  No results found for this or any previous visit.    NELLY:  No results found for this or any previous visit.    ASSESSMENT/PLAN:           Pre-op Assessment    I have reviewed the Patient Summary Reports.     I have reviewed the Nursing Notes. I have reviewed the NPO Status.   I have reviewed the Medications.     Review of Systems  Anesthesia Hx:  No problems with previous Anesthesia  History of prior surgery of interest to airway management or planning: Denies Family Hx of Anesthesia complications.   Denies Personal Hx of Anesthesia complications.   Social:  Former Smoker, Alcohol Use    Hematology/Oncology:          -- Denies Anemia:   Cardiovascular:   Denies Hypertension.  Denies CAD.     Denies CHF. no hyperlipidemia    Pulmonary:   Denies COPD.  Denies Asthma.    Renal/:   Denies Chronic Renal Disease.     Hepatic/GI:   Denies GERD.    Musculoskeletal:   Denies Arthritis.     Neurological:   Denies CVA. Denies Seizures.    Endocrine:   Denies Diabetes.    Psych:   Denies Psychiatric History.          Physical Exam  General: Well nourished, Cooperative, Alert and Oriented    Airway:  Mallampati: III / II  Mouth Opening: Normal  TM Distance: Normal  Tongue: Normal  Neck ROM: Normal ROM    Dental:  Intact        Anesthesia Plan  Type of Anesthesia, risks & benefits discussed:    Anesthesia Type: Gen ETT  Intra-op Monitoring Plan: Standard ASA Monitors  Post Op Pain Control Plan: multimodal analgesia and IV/PO Opioids PRN  Induction:  IV  Airway Plan: Direct and Video, Post-Induction  Informed Consent: Informed consent signed with the Patient and all parties understand the risks and agree with anesthesia plan.  All questions answered.   ASA Score: 2  Day of Surgery Review of History & Physical: H&P Update referred to the surgeon/provider.    Ready For Surgery From Anesthesia Perspective.     .

## 2023-08-31 ENCOUNTER — PATIENT MESSAGE (OUTPATIENT)
Dept: SURGERY | Facility: HOSPITAL | Age: 58
End: 2023-08-31
Payer: COMMERCIAL

## 2023-08-31 ENCOUNTER — ANESTHESIA (OUTPATIENT)
Dept: SURGERY | Facility: HOSPITAL | Age: 58
End: 2023-08-31
Payer: COMMERCIAL

## 2023-08-31 ENCOUNTER — HOSPITAL ENCOUNTER (OUTPATIENT)
Facility: HOSPITAL | Age: 58
Discharge: HOME | End: 2023-08-31
Attending: SURGERY | Admitting: SURGERY
Payer: COMMERCIAL

## 2023-08-31 VITALS
DIASTOLIC BLOOD PRESSURE: 71 MMHG | WEIGHT: 200 LBS | BODY MASS INDEX: 27.09 KG/M2 | OXYGEN SATURATION: 96 % | SYSTOLIC BLOOD PRESSURE: 113 MMHG | RESPIRATION RATE: 16 BRPM | HEART RATE: 65 BPM | TEMPERATURE: 98 F | HEIGHT: 72 IN

## 2023-08-31 DIAGNOSIS — K42.9 UMBILICAL HERNIA WITHOUT OBSTRUCTION AND WITHOUT GANGRENE: ICD-10-CM

## 2023-08-31 PROCEDURE — 25000003 PHARM REV CODE 250

## 2023-08-31 PROCEDURE — 63600175 PHARM REV CODE 636 W HCPCS

## 2023-08-31 PROCEDURE — 25000003 PHARM REV CODE 250: Performed by: SURGERY

## 2023-08-31 PROCEDURE — 49591 PR REPAIR ANT ABD HERNIA INITIAL < 3 CM REDUCIBLE: ICD-10-PCS | Mod: ,,, | Performed by: SURGERY

## 2023-08-31 PROCEDURE — 37000009 HC ANESTHESIA EA ADD 15 MINS: Performed by: SURGERY

## 2023-08-31 PROCEDURE — D9220A PRA ANESTHESIA: Mod: ,,, | Performed by: ANESTHESIOLOGY

## 2023-08-31 PROCEDURE — 36000707: Performed by: SURGERY

## 2023-08-31 PROCEDURE — D9220A PRA ANESTHESIA: ICD-10-PCS | Mod: ,,, | Performed by: ANESTHESIOLOGY

## 2023-08-31 PROCEDURE — 71000044 HC DOSC ROUTINE RECOVERY FIRST HOUR: Performed by: SURGERY

## 2023-08-31 PROCEDURE — 71000015 HC POSTOP RECOV 1ST HR: Performed by: SURGERY

## 2023-08-31 PROCEDURE — 37000008 HC ANESTHESIA 1ST 15 MINUTES: Performed by: SURGERY

## 2023-08-31 PROCEDURE — C1781 MESH (IMPLANTABLE): HCPCS | Performed by: SURGERY

## 2023-08-31 PROCEDURE — 36000706: Performed by: SURGERY

## 2023-08-31 PROCEDURE — 49591 RPR AA HRN 1ST < 3 CM RDC: CPT | Mod: ,,, | Performed by: SURGERY

## 2023-08-31 DEVICE — PATCH HERNIA MESH VENTRALEX: Type: IMPLANTABLE DEVICE | Site: UMBILICAL | Status: FUNCTIONAL

## 2023-08-31 RX ORDER — HYDROMORPHONE HYDROCHLORIDE 1 MG/ML
0.2 INJECTION, SOLUTION INTRAMUSCULAR; INTRAVENOUS; SUBCUTANEOUS EVERY 5 MIN PRN
Status: DISCONTINUED | OUTPATIENT
Start: 2023-08-31 | End: 2023-08-31 | Stop reason: HOSPADM

## 2023-08-31 RX ORDER — OXYCODONE HYDROCHLORIDE 5 MG/1
5 TABLET ORAL EVERY 4 HOURS PRN
Status: DISCONTINUED | OUTPATIENT
Start: 2023-08-31 | End: 2023-08-31 | Stop reason: HOSPADM

## 2023-08-31 RX ORDER — FENTANYL CITRATE 50 UG/ML
INJECTION, SOLUTION INTRAMUSCULAR; INTRAVENOUS
Status: DISCONTINUED | OUTPATIENT
Start: 2023-08-31 | End: 2023-08-31

## 2023-08-31 RX ORDER — DEXAMETHASONE SODIUM PHOSPHATE 4 MG/ML
INJECTION, SOLUTION INTRA-ARTICULAR; INTRALESIONAL; INTRAMUSCULAR; INTRAVENOUS; SOFT TISSUE
Status: DISCONTINUED | OUTPATIENT
Start: 2023-08-31 | End: 2023-08-31

## 2023-08-31 RX ORDER — PROPOFOL 10 MG/ML
VIAL (ML) INTRAVENOUS
Status: DISCONTINUED | OUTPATIENT
Start: 2023-08-31 | End: 2023-08-31

## 2023-08-31 RX ORDER — IBUPROFEN 400 MG/1
400 TABLET ORAL EVERY 6 HOURS PRN
COMMUNITY
Start: 2023-08-31 | End: 2024-01-24

## 2023-08-31 RX ORDER — LIDOCAINE HYDROCHLORIDE 20 MG/ML
INJECTION, SOLUTION EPIDURAL; INFILTRATION; INTRACAUDAL; PERINEURAL
Status: DISCONTINUED | OUTPATIENT
Start: 2023-08-31 | End: 2023-08-31

## 2023-08-31 RX ORDER — LIDOCAINE HYDROCHLORIDE AND EPINEPHRINE 5; 5 MG/ML; UG/ML
INJECTION, SOLUTION INFILTRATION; PERINEURAL
Status: DISCONTINUED | OUTPATIENT
Start: 2023-08-31 | End: 2023-08-31 | Stop reason: HOSPADM

## 2023-08-31 RX ORDER — ONDANSETRON 2 MG/ML
INJECTION INTRAMUSCULAR; INTRAVENOUS
Status: DISCONTINUED | OUTPATIENT
Start: 2023-08-31 | End: 2023-08-31

## 2023-08-31 RX ORDER — ACETAMINOPHEN 500 MG
500 TABLET ORAL EVERY 6 HOURS PRN
Refills: 0 | COMMUNITY
Start: 2023-08-31 | End: 2024-01-24

## 2023-08-31 RX ORDER — SODIUM CHLORIDE 0.9 % (FLUSH) 0.9 %
10 SYRINGE (ML) INJECTION
Status: DISCONTINUED | OUTPATIENT
Start: 2023-08-31 | End: 2023-08-31 | Stop reason: HOSPADM

## 2023-08-31 RX ORDER — CEFAZOLIN SODIUM 1 G/3ML
INJECTION, POWDER, FOR SOLUTION INTRAMUSCULAR; INTRAVENOUS
Status: DISCONTINUED | OUTPATIENT
Start: 2023-08-31 | End: 2023-08-31

## 2023-08-31 RX ORDER — HALOPERIDOL 5 MG/ML
0.5 INJECTION INTRAMUSCULAR EVERY 10 MIN PRN
Status: DISCONTINUED | OUTPATIENT
Start: 2023-08-31 | End: 2023-08-31 | Stop reason: HOSPADM

## 2023-08-31 RX ORDER — ROCURONIUM BROMIDE 10 MG/ML
INJECTION, SOLUTION INTRAVENOUS
Status: DISCONTINUED | OUTPATIENT
Start: 2023-08-31 | End: 2023-08-31

## 2023-08-31 RX ORDER — OXYCODONE HYDROCHLORIDE 5 MG/1
5 TABLET ORAL EVERY 4 HOURS PRN
Qty: 8 TABLET | Refills: 0 | Status: SHIPPED | OUTPATIENT
Start: 2023-08-31 | End: 2024-01-24

## 2023-08-31 RX ORDER — KETAMINE HCL IN 0.9 % NACL 50 MG/5 ML
SYRINGE (ML) INTRAVENOUS
Status: DISCONTINUED | OUTPATIENT
Start: 2023-08-31 | End: 2023-08-31

## 2023-08-31 RX ORDER — MIDAZOLAM HYDROCHLORIDE 1 MG/ML
INJECTION, SOLUTION INTRAMUSCULAR; INTRAVENOUS
Status: DISCONTINUED | OUTPATIENT
Start: 2023-08-31 | End: 2023-08-31

## 2023-08-31 RX ORDER — ACETAMINOPHEN 500 MG
1000 TABLET ORAL
Status: COMPLETED | OUTPATIENT
Start: 2023-08-31 | End: 2023-08-31

## 2023-08-31 RX ORDER — PHENYLEPHRINE HCL IN 0.9% NACL 1 MG/10 ML
SYRINGE (ML) INTRAVENOUS
Status: DISCONTINUED | OUTPATIENT
Start: 2023-08-31 | End: 2023-08-31

## 2023-08-31 RX ADMIN — Medication 50 MCG: at 07:08

## 2023-08-31 RX ADMIN — SODIUM CHLORIDE, SODIUM GLUCONATE, SODIUM ACETATE, POTASSIUM CHLORIDE, MAGNESIUM CHLORIDE, SODIUM PHOSPHATE, DIBASIC, AND POTASSIUM PHOSPHATE: .53; .5; .37; .037; .03; .012; .00082 INJECTION, SOLUTION INTRAVENOUS at 07:08

## 2023-08-31 RX ADMIN — ONDANSETRON 4 MG: 2 INJECTION INTRAMUSCULAR; INTRAVENOUS at 07:08

## 2023-08-31 RX ADMIN — ROCURONIUM BROMIDE 50 MG: 10 INJECTION, SOLUTION INTRAVENOUS at 07:08

## 2023-08-31 RX ADMIN — MIDAZOLAM 2 MG: 1 INJECTION INTRAMUSCULAR; INTRAVENOUS at 06:08

## 2023-08-31 RX ADMIN — ROCURONIUM BROMIDE 30 MG: 10 INJECTION, SOLUTION INTRAVENOUS at 07:08

## 2023-08-31 RX ADMIN — Medication 100 MCG: at 08:08

## 2023-08-31 RX ADMIN — PROPOFOL 200 MG: 10 INJECTION, EMULSION INTRAVENOUS at 07:08

## 2023-08-31 RX ADMIN — DEXAMETHASONE SODIUM PHOSPHATE 8 MG: 4 INJECTION INTRA-ARTICULAR; INTRALESIONAL; INTRAMUSCULAR; INTRAVENOUS; SOFT TISSUE at 07:08

## 2023-08-31 RX ADMIN — SUGAMMADEX 400 MG: 100 INJECTION, SOLUTION INTRAVENOUS at 07:08

## 2023-08-31 RX ADMIN — Medication 30 MG: at 07:08

## 2023-08-31 RX ADMIN — PROPOFOL 30 MG: 10 INJECTION, EMULSION INTRAVENOUS at 07:08

## 2023-08-31 RX ADMIN — FENTANYL CITRATE 100 MCG: 50 INJECTION INTRAMUSCULAR; INTRAVENOUS at 07:08

## 2023-08-31 RX ADMIN — ACETAMINOPHEN 1000 MG: 500 TABLET ORAL at 06:08

## 2023-08-31 RX ADMIN — CEFAZOLIN 2 G: 330 INJECTION, POWDER, FOR SOLUTION INTRAMUSCULAR; INTRAVENOUS at 07:08

## 2023-08-31 RX ADMIN — OXYCODONE HYDROCHLORIDE 5 MG: 5 TABLET ORAL at 08:08

## 2023-08-31 RX ADMIN — LIDOCAINE HYDROCHLORIDE 80 MG: 20 INJECTION, SOLUTION EPIDURAL; INFILTRATION; INTRACAUDAL at 07:08

## 2023-08-31 RX ADMIN — Medication 100 MCG: at 07:08

## 2023-08-31 RX ADMIN — SODIUM CHLORIDE: 0.9 INJECTION, SOLUTION INTRAVENOUS at 06:08

## 2023-08-31 NOTE — OP NOTE
[facility] - Surgery ([facility])  Operative Note     Surgery Date: 8/31/2023      Surgeon(s) and Role:     * Barrett Torre Jr., MD - Primary     * Roel Alvarado MD - Resident - Assisting           Pre-op Diagnosis:  Umbilical hernia without obstruction and without gangrene [K42.9]     Post-op Diagnosis:  Post-Op Diagnosis Codes:     * Umbilical hernia without obstruction and without gangrene [K42.9]     Procedure(s) (LRB):  REPAIR, HERNIA, UMBILICAL, OPEN w/ possible mesh (N/A)     Anesthesia: General      Indication: Umbilical hernia that is occasionally painful. Patient desired repair. Discussed risks and benefits of repair including recurrence, infection, bleeding, chronic pain, scar, bowel injury, etc. Discussed pros and cons of mesh.    Technical Procedures Used:    Patient was brought to the OR and intubated. Patient was prepped and draped. A time out was called and all were in agreement.    An curvilinear incision was made inferior to the umbilicus, and the subcutaneous fat was divided with electrocautery and blunt dissection down to the fascia. Then ,using digital dissection, Kellys, and tonsils a plane was developed circumferentially around the hernia sac. A tonsil was passed through the plane.    Then, the overlying umbilical skin was divided from the sac with sharp dissection. The hernia sac was gently incised, revealing omentum. The hernia sac was then further cleared of its attachments down to the fascia circumferentially.     The defect measured about 2cm. Given the size we decided to use 4.3 ventralex mesh. A plane was developed behind the defect and the mesh was placed. The mesh was secured to the overlying fascia with 4 quadrant 0 prolene ties.     The defect itself was then closed with running 0 prolene suture. The umbilicus was tacked down to fascia with 3-0 vicryl. The deep dermal layer was closed with 3-0 vicryl. The skin was closed with 4-0 monocryl and covered with mastisol and steri  strips.    The patient was extubated and brought to PACU in stable condition.        Jackson Alvarado MD  General Surgery Resident, PGY-3    I was present for the entire procedure.

## 2023-08-31 NOTE — PLAN OF CARE
..Pt alert and orientated. Spouse at bs. VSS. No distress noted. Pt denies N/V/D. Pt tolerating PO fluids. Pt states they are ready for discharge.

## 2023-08-31 NOTE — H&P
Interval History: No significant change since H&P below was written.  Consent obtained.  Risks of the procedure discussed in detail.  No further questions or concerns.      Jackson Alvarado MD - PGY 3  General Surgery    GENERAL SURGERY CLINIC H&P     Subjective:      Marty Olivarez is a 57 y.o. male with no PMH who presents to clinic for evaluation of umbilical hernia. Patient reports that he has noted a bulge in his umbilicus for the last year. Notes that it has been growing in size slowly. Is always able to reduce the hernia. Denies nausea, vomiting, constipation, and skin changes over the hernia. Has never had any abdominal surgeries.         PMH:        Past Medical History:   Diagnosis Date    Encounter for vasectomy 2021    Sterilization consult 2017    Urinary tract infection           Past Surgical History:         Past Surgical History:   Procedure Laterality Date    VASECTOMY   2021         Social History:  Social History               Socioeconomic History    Marital status:     Number of children: 2   Occupational History       Employer: [employer]   Tobacco Use    Smoking status: Former       Types: Cigarettes       Quit date:        Years since quittin.5       Passive exposure: Past    Smokeless tobacco: Never   Substance and Sexual Activity    Alcohol use: Yes       Alcohol/week: 6.0 standard drinks       Types: 6 Cans of beer per week       Comment: 2-3 times/weekly     Drug use: No    Sexual activity: Yes       Partners: Female   Social History Narrative     . 2 daughters ( 27 yo and 23 yo) Cares for his mother who lives with him. Older daughter expecting first son is few weeks (she lost a pregnancy 2022). 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             Allergies: Review of patient's allergies indicates:  No Known Allergies     Medications:         Current Outpatient Medications on File Prior to Visit   Medication Sig Dispense Refill     diazePAM (VALIUM) 10 MG Tab Take 1 tablet (10 mg total) by mouth On call Procedure (Vasectomy). 1 tablet 0    HYDROcodone-acetaminophen (NORCO) 5-325 mg per tablet Take 1 tablet by mouth every 6 (six) hours as needed for Pain. (Patient not taking: Reported on 6/28/2023) 15 tablet 0                Current Facility-Administered Medications on File Prior to Visit   Medication Dose Route Frequency Provider Last Rate Last Admin    acetaminophen tablet 650 mg  650 mg Oral Once PRN Gaye Martin, DO        albuterol inhaler 2 puff  2 puff Inhalation Q20 Min PRN Gaye Bourneo, DO        diphenhydrAMINE injection 25 mg  25 mg Intravenous Once PRN Gaye Martin, DO        EPINEPHrine (EPIPEN) 0.3 mg/0.3 mL pen injection 0.3 mg  0.3 mg Intramuscular PRN Gaye Bourneo, DO        methylPREDNISolone sodium succinate injection 40 mg  40 mg Intravenous Once PRN Gaye Bourneo, DO        ondansetron disintegrating tablet 4 mg  4 mg Oral Once PRN Gaye Bourneo, DO        sodium chloride 0.9% 500 mL flush bag   Intravenous PRN Gaye Bourneo, DO        sodium chloride 0.9% flush 10 mL  10 mL Intravenous PRN Gaye Martin, DO                Objective:      Vital Signs (Most Recent)  Pulse: 67 (06/28/23 0914)  BP: 137/84 (06/28/23 0914)     ROS A 10+ review of systems was performed with pertinent positives and negatives noted above in the history of present illness.  Other systems were negative unless otherwise specified.     Physical Exam:  Gen: awake, alert, in no acute distress  HEENT: normocephalic, atraumatic, EOMI, no scleral icterus  CV: regular rate and rhythm  Pulm: equal chest rise bilaterally, normal work of breathing  Abd:  soft, non-tender, no guarding              Reducible 1 cm umbilical hernia  Ext: WWP, skin warm and dry        Assessment:      Marty Olivarez is a 57 y.o. male with reducible umbilical hernia.     Plan:      - Plan for open repair of umbilical hernia  - Obtain PCP clearance  - Risks and benefits of procedure  discussed with patient. All questions answered  - Consent signed in clinic        Alfie Boogie MD   [facility] PGY-1     I have personally taken the history and examined this patient and agree with the resident's note as stated above.           Barrett Torre MD

## 2023-08-31 NOTE — ANESTHESIA PROCEDURE NOTES
Intubation    Date/Time: 8/31/2023 7:08 AM    Performed by: Aaliyah Mccormack MD  Authorized by: Jey Novak MD    Intubation:     Induction:  Intravenous    Intubated:  Postinduction    Mask Ventilation:  Moderately difficult with oral airway    Attempts:  1    Attempted By:  Resident anesthesiologist    Method of Intubation:  Direct    Blade:  Brown 2    Laryngeal View Grade: Grade IIA - cords partially seen      Difficult Airway Encountered?: No      Complications:  None    Airway Device:  Oral endotracheal tube    Airway Device Size:  8.0    Style/Cuff Inflation:  Cuffed (inflated to minimal occlusive pressure)    Tube secured:  24    Secured at:  The lips    Placement Verified By:  Capnometry and Revisualization with laryngoscopy    Complicating Factors:  None    Findings Post-Intubation:  BS equal bilateral and atraumatic/condition of teeth unchanged

## 2023-08-31 NOTE — BRIEF OP NOTE
Scottie Ryan - Surgery ([facility])  Brief Operative Note    Surgery Date: 8/31/2023     Surgeon(s) and Role:     * Barrett Torre Jr., MD - Primary     * Roel Alvarado MD - Resident - Assisting        Pre-op Diagnosis:  Umbilical hernia without obstruction and without gangrene [K42.9]    Post-op Diagnosis:  Post-Op Diagnosis Codes:     * Umbilical hernia without obstruction and without gangrene [K42.9]    Procedure(s) (LRB):  REPAIR, HERNIA, UMBILICAL, OPEN w/ possible mesh (N/A)    Anesthesia: General    Operative Findings: Open hernia repair with 4.3 cm ventralex mesh placement.    Estimated Blood Loss: minimal         Specimens:   Specimen (24h ago, onward)      None              Discharge Note    OUTCOME: Patient tolerated treatment/procedure well without complication and is now ready for discharge.    DISPOSITION: Home or Self Care    FINAL DIAGNOSIS:  Umbilical hernia  FOLLOWUP: In clinic    DISCHARGE INSTRUCTIONS:    Discharge Procedure Orders   Lifting restrictions   Order Comments: No lifting greater than 15 pounds for 6 weeks     Notify your health care provider if you experience any of the following:  temperature >100.4     Notify your health care provider if you experience any of the following:  persistent nausea and vomiting or diarrhea     Notify your health care provider if you experience any of the following:  severe uncontrolled pain     Notify your health care provider if you experience any of the following:  redness, tenderness, or signs of infection (pain, swelling, redness, odor or green/yellow discharge around incision site)     Notify your health care provider if you experience any of the following:  difficulty breathing or increased cough     Notify your health care provider if you experience any of the following:  severe persistent headache     Notify your health care provider if you experience any of the following:  worsening rash     Notify your health care provider if you  experience any of the following:  persistent dizziness, light-headedness, or visual disturbances     Notify your health care provider if you experience any of the following:  increased confusion or weakness     Remove dressing in 24 hours   Order Comments: Remove gauze dressing tomorrow, but leave the underlying steri strips in place. Ok to shower. No bathing or soaking of the wound for 2 weeks. Steri strips will fall off on their own in 1-2 weeks.     Activity as tolerated

## 2023-08-31 NOTE — TRANSFER OF CARE
Anesthesia Transfer of Care Note    Patient: Marty Olivarez    Procedure(s) Performed: Procedure(s) (LRB):  REPAIR, HERNIA, UMBILICAL, OPEN w/ possible mesh (N/A)    Patient location: [facility]    Anesthesia Type: general    Transport from OR: Transported from OR on 6-10 L/min O2 by face mask with adequate spontaneous ventilation    Post pain: adequate analgesia    Post assessment: no apparent anesthetic complications and tolerated procedure well    Post vital signs: stable    Level of consciousness: responds to stimulation    Nausea/Vomiting: no nausea/vomiting    Complications: none    Transfer of care protocol was followed      Last vitals:   Visit Vitals  /87 (BP Location: Left arm, Patient Position: Lying)   Pulse 66   Temp 36.7 °C (98.1 °F)   Resp 18   Ht 6' (1.829 m)   Wt 90.7 kg (200 lb)   SpO2 98%   BMI 27.12 kg/m²

## 2023-09-01 NOTE — ANESTHESIA POSTPROCEDURE EVALUATION
Anesthesia Post Evaluation    Patient: Marty Olivarez    Procedure(s) Performed: Procedure(s) (LRB):  REPAIR, HERNIA, UMBILICAL, OPEN w/ possible mesh (N/A)    Final Anesthesia Type: general      Patient location during evaluation: PACU  Patient participation: Yes- Able to Participate  Level of consciousness: awake and alert  Post-procedure vital signs: reviewed and stable  Pain management: adequate  Airway patency: patent    PONV status at discharge: No PONV  Anesthetic complications: no      Cardiovascular status: blood pressure returned to baseline  Respiratory status: unassisted  Hydration status: euvolemic  Follow-up not needed.          Vitals Value Taken Time   /71 08/31/23 0917   Temp 36.5 °C (97.7 °F) 08/31/23 0915   Pulse 62 08/31/23 0918   Resp 15 08/31/23 0918   SpO2 95 % 08/31/23 0918   Vitals shown include unvalidated device data.      No case tracking events are documented in the log.      Pain/Adrian Score: Pain Rating Prior to Med Admin: 5 (8/31/2023  8:49 AM)  Adrian Score: 10 (8/31/2023  9:15 AM)

## 2023-09-13 ENCOUNTER — OFFICE VISIT (OUTPATIENT)
Dept: SURGERY | Facility: CLINIC | Age: 58
End: 2023-09-13
Payer: COMMERCIAL

## 2023-09-13 VITALS
DIASTOLIC BLOOD PRESSURE: 86 MMHG | HEART RATE: 76 BPM | HEIGHT: 73 IN | BODY MASS INDEX: 27.03 KG/M2 | SYSTOLIC BLOOD PRESSURE: 128 MMHG | WEIGHT: 203.94 LBS

## 2023-09-13 DIAGNOSIS — Z09 POSTOP CHECK: Primary | ICD-10-CM

## 2023-09-13 PROCEDURE — 99999 PR PBB SHADOW E&M-EST. PATIENT-LVL III: CPT | Mod: PBBFAC,,, | Performed by: SURGERY

## 2023-09-13 PROCEDURE — 99212 OFFICE O/P EST SF 10 MIN: CPT | Mod: S$GLB,,, | Performed by: SURGERY

## 2023-09-13 PROCEDURE — 99212 PR OFFICE/OUTPT VISIT, EST, LEVL II, 10-19 MIN: ICD-10-PCS | Mod: S$GLB,,, | Performed by: SURGERY

## 2023-09-13 PROCEDURE — 99999 PR PBB SHADOW E&M-EST. PATIENT-LVL III: ICD-10-PCS | Mod: PBBFAC,,, | Performed by: SURGERY

## 2023-09-13 NOTE — PROGRESS NOTES
"[facility]  General Surgery  Post-Operative Note    Subjective:       Marty Olivarez is a 58 year old male who presents to the clinic 2 weeks following open umbilical hernia repair with mesh on 8/31/23. He is eating a regular diet without difficulty. Bowel movements are Normal.  The patient is not having any pain..      Objective:      /86   Pulse 76   Ht 6' 1" (1.854 m)   Wt 92.5 kg (203 lb 14.8 oz)   BMI 26.90 kg/m²     General:  alert, appears stated age, and cooperative   Abdomen: soft, bowel sounds active, non-tender   Incision:   healing well, no drainage, no erythema, no hernia, no seroma, no swelling, no dehiscence, incision well approximated        Assessment:     Marty Olivarez is a 58 year old male s/p open umbilical hernia repair with mesh 8/31/23. He is doing well    Plan:     1. Continue any current medications.  2. Wound care discussed.  3. Lifting restrictions until 10/12, nothing more than 20 pounds.   4. Follow up PRN      Pete Christian MD  [facility]      I have personally taken the history and examined this patient and agree with the resident's note as stated above.         Barrett Torre MD      "

## 2023-10-11 ENCOUNTER — PATIENT MESSAGE (OUTPATIENT)
Dept: SURGERY | Facility: CLINIC | Age: 58
End: 2023-10-11
Payer: COMMERCIAL

## 2023-10-24 ENCOUNTER — PATIENT MESSAGE (OUTPATIENT)
Dept: RESEARCH | Facility: HOSPITAL | Age: 58
End: 2023-10-24
Payer: COMMERCIAL

## 2023-12-11 ENCOUNTER — PATIENT MESSAGE (OUTPATIENT)
Dept: PRIMARY CARE CLINIC | Facility: CLINIC | Age: 58
End: 2023-12-11
Payer: COMMERCIAL

## 2023-12-12 ENCOUNTER — PATIENT MESSAGE (OUTPATIENT)
Dept: PRIMARY CARE CLINIC | Facility: CLINIC | Age: 58
End: 2023-12-12
Payer: COMMERCIAL

## 2023-12-28 ENCOUNTER — PATIENT MESSAGE (OUTPATIENT)
Dept: PRIMARY CARE CLINIC | Facility: CLINIC | Age: 58
End: 2023-12-28
Payer: COMMERCIAL

## 2023-12-29 ENCOUNTER — PATIENT MESSAGE (OUTPATIENT)
Dept: PRIMARY CARE CLINIC | Facility: CLINIC | Age: 58
End: 2023-12-29
Payer: COMMERCIAL

## 2024-01-24 ENCOUNTER — OFFICE VISIT (OUTPATIENT)
Dept: PRIMARY CARE CLINIC | Facility: CLINIC | Age: 59
End: 2024-01-24
Attending: FAMILY MEDICINE
Payer: COMMERCIAL

## 2024-01-24 ENCOUNTER — TELEPHONE (OUTPATIENT)
Dept: PRIMARY CARE CLINIC | Facility: CLINIC | Age: 59
End: 2024-01-24

## 2024-01-24 VITALS
WEIGHT: 208.13 LBS | DIASTOLIC BLOOD PRESSURE: 80 MMHG | BODY MASS INDEX: 27.59 KG/M2 | OXYGEN SATURATION: 99 % | HEIGHT: 73 IN | HEART RATE: 84 BPM | SYSTOLIC BLOOD PRESSURE: 130 MMHG

## 2024-01-24 DIAGNOSIS — R17 ELEVATED BILIRUBIN: ICD-10-CM

## 2024-01-24 DIAGNOSIS — Z00.00 ROUTINE MEDICAL EXAM: Primary | ICD-10-CM

## 2024-01-24 DIAGNOSIS — Z86.010 HISTORY OF COLON POLYPS: ICD-10-CM

## 2024-01-24 PROBLEM — Z01.818 PREOPERATIVE EXAMINATION: Status: RESOLVED | Noted: 2023-04-17 | Resolved: 2024-01-24

## 2024-01-24 PROBLEM — D64.9 NORMOCYTIC ANEMIA: Status: RESOLVED | Noted: 2023-08-16 | Resolved: 2024-01-24

## 2024-01-24 PROCEDURE — 99396 PREV VISIT EST AGE 40-64: CPT | Mod: S$GLB,,, | Performed by: FAMILY MEDICINE

## 2024-01-24 PROCEDURE — 99999 PR PBB SHADOW E&M-EST. PATIENT-LVL III: CPT | Mod: PBBFAC,,, | Performed by: FAMILY MEDICINE

## 2024-01-24 NOTE — TELEPHONE ENCOUNTER
----- Message from Gaye Martin DO sent at 1/24/2024  2:08 PM CST -----  Please fax referral and copy of labs to Dr. Malcolm Kinsey   Address  [address] [address]  Phone  [phone]

## 2024-01-24 NOTE — Clinical Note
Please fax referral and copy of labs to Dr. Malcolm Kinsey  Address [address] [place] [id number] Phone [phone]

## 2024-01-24 NOTE — PROGRESS NOTES
FAMILY MEDICINE  [facility]    Reason for visit:   No chief complaint on file.        SUBJECTIVE: Marty Olivarez is a 58 y.o. male  - with Gilbert's, history of colon polyps and family history of coronary artery disease presents routine annual physical     Pain management:  Dr. Freddy Porras  Urology:  Dr. Swartz   Cardiology: Dr. Rangel  GI Dr. Malcolm Kinsey [facility]     Marty Olivarez reports that he is doing well and denies any concerns or complaints. He was suppose to see his GI Dr. Malcolm Kinsey today for his colonoscopy consult but reports it is rescheduled for 2/1/24             Review of Systems   All other systems reviewed and are negative.      HEALTH MAINTENANCE:   Health Maintenance   Topic Date Due    Colorectal Cancer Screening  02/27/2024    PROSTATE-SPECIFIC ANTIGEN  12/28/2024    TETANUS VACCINE  05/29/2027    Lipid Panel  12/28/2027    Hepatitis C Screening  Completed    Shingles Vaccine  Completed     Health Maintenance Topics with due status: Not Due       Topic Last Completion Date    TETANUS VACCINE 05/29/2017    Lipid Panel 12/28/2022    PROSTATE-SPECIFIC ANTIGEN 12/28/2023    Hemoglobin A1c (Diabetic Prevention Screening) 12/28/2023     Health Maintenance Due   Topic Date Due    COVID-19 Vaccine (1) Never done    Colorectal Cancer Screening  02/27/2024       HISTORY:   Past Medical History:   Diagnosis Date    Encounter for vasectomy 4/16/2021    Sterilization consult 5/29/2017    Urinary tract infection        Past Surgical History:   Procedure Laterality Date    FOOT SURGERY Right 04/21/2023    cyst removal    HERNIA REPAIR  10/23    UMBILICAL HERNIA REPAIR N/A 08/31/2023    Procedure: REPAIR, HERNIA, UMBILICAL, OPEN w/ possible mesh;  Surgeon: Barrett Torre Jr., MD;  Location: [facility] OR [address] [place] [address];  Service: General;  Laterality: N/A;    VASECTOMY  04/16/2021       Family History   Problem Relation Age of Onset    Pulmonary embolism Mother         s/p knee  surgery    Hyperlipidemia Father     Hypertension Father     Lupus Sister     Stroke Sister     Heart disease Brother 60        MI LAD occlusion    No Known Problems Brother     No Known Problems Daughter     No Known Problems Daughter     Prostate cancer Neg Hx     Kidney disease Neg Hx        Social History     Tobacco Use    Smoking status: Former     Current packs/day: 0.00     Average packs/day: 0.2 packs/day for 6.2 years (1.3 ttl pk-yrs)     Types: Cigarettes     Start date:      Quit date: 1983     Years since quittin.5     Passive exposure: Past    Smokeless tobacco: Never   Substance Use Topics    Alcohol use: Yes     Alcohol/week: 10.0 standard drinks of alcohol     Types: 10 Drinks containing 0.5 oz of alcohol per week     Comment: 2-3 times/weekly     Drug use: Never       Social History     Social History Narrative    . 2 daughters ( 30 yo and 24 yo) Cares for his mother who lives with him. Older daughter expecting first son is few weeks (she lost a pregnancy 2022) and gave birth to a boy 2023. 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        ALLERGIES:   Review of patient's allergies indicates:  No Known Allergies    MEDS:   Current Outpatient Medications on File Prior to Visit   Medication Sig Dispense Refill Last Dose    [DISCONTINUED] acetaminophen (TYLENOL) 500 MG tablet Take 1 tablet (500 mg total) by mouth every 6 (six) hours as needed for Pain (alternate with ibuprofen). (Patient not taking: Reported on 2023)  0     [DISCONTINUED] ibuprofen (ADVIL,MOTRIN) 400 MG tablet Take 1 tablet (400 mg total) by mouth every 6 (six) hours as needed for Other (pain). Alternate with tylenol (Patient not taking: Reported on 2023)       [DISCONTINUED] oxyCODONE (ROXICODONE) 5 MG immediate release tablet Take 1 tablet (5 mg total) by mouth every 4 (four) hours as needed for Pain (if pain is not relieved by alternating tylenol and ibuprofen). (Patient not  "taking: Reported on 9/13/2023) 8 tablet 0          Vital signs:   Vitals:    01/24/24 1344   BP: 130/80   Pulse: 84   SpO2: 99%   Weight: 94.4 kg (208 lb 1.8 oz)   Height: 6' 1" (1.854 m)     Body mass index is 27.46 kg/m².    PHYSICAL EXAM:     Physical Exam  Vitals reviewed.   Constitutional:       General: He is not in acute distress.     Appearance: Normal appearance.   HENT:      Head: Normocephalic and atraumatic.      Right Ear: Tympanic membrane and ear canal normal.      Left Ear: Tympanic membrane and ear canal normal.      Nose: Nose normal.      Mouth/Throat:      Pharynx: Uvula midline.   Eyes:      General: No scleral icterus.     Conjunctiva/sclera: Conjunctivae normal.   Neck:      Thyroid: No thyromegaly.      Vascular: Normal carotid pulses. No carotid bruit or JVD.      Trachea: Trachea normal.   Cardiovascular:      Rate and Rhythm: Normal rate and regular rhythm.      Pulses: Normal pulses.      Heart sounds: Normal heart sounds. No murmur heard.     No friction rub. No gallop.   Pulmonary:      Effort: Pulmonary effort is normal.      Breath sounds: Normal breath sounds. No decreased breath sounds, wheezing, rhonchi or rales.   Abdominal:      General: Bowel sounds are normal.      Palpations: Abdomen is soft. There is no hepatomegaly.      Tenderness: There is no abdominal tenderness.      Hernia: No hernia is present.       Musculoskeletal:      Cervical back: Neck supple.      Right lower leg: No edema.      Left lower leg: No edema.   Lymphadenopathy:      Cervical: No cervical adenopathy.   Skin:     General: Skin is warm.      Capillary Refill: Capillary refill takes less than 2 seconds.      Nails: There is no clubbing.   Neurological:      Mental Status: He is alert and oriented to person, place, and time.             PERTINENT RESULTS:   No visits with results within 1 Week(s) from this visit.   Latest known visit with results is:   Lab Visit on 12/28/2023   Component Date Value Ref " Range Status    WBC 12/28/2023 5.12  3.90 - 12.70 K/uL Final    RBC 12/28/2023 4.79  4.60 - 6.20 M/uL Final    Hemoglobin 12/28/2023 15.4  14.0 - 18.0 g/dL Final    Hematocrit 12/28/2023 44.2  40.0 - 54.0 % Final    MCV 12/28/2023 92  82 - 98 fL Final    MCH 12/28/2023 32.2 (H)  27.0 - 31.0 pg Final    MCHC 12/28/2023 34.8  32.0 - 36.0 g/dL Final    RDW 12/28/2023 12.5  11.5 - 14.5 % Final    Platelets 12/28/2023 262  150 - 450 K/uL Final    MPV 12/28/2023 9.7  9.2 - 12.9 fL Final    Immature Granulocytes 12/28/2023 0.2  0.0 - 0.5 % Final    Gran # (ANC) 12/28/2023 2.7  1.8 - 7.7 K/uL Final    Immature Grans (Abs) 12/28/2023 0.01  0.00 - 0.04 K/uL Final    Comment: Mild elevation in immature granulocytes is non specific and   can be seen in a variety of conditions including stress response,   acute inflammation, trauma and pregnancy. Correlation with other   laboratory and clinical findings is essential.      Lymph # 12/28/2023 1.5  1.0 - 4.8 K/uL Final    Mono # 12/28/2023 0.5  0.3 - 1.0 K/uL Final    Eos # 12/28/2023 0.3  0.0 - 0.5 K/uL Final    Baso # 12/28/2023 0.08  0.00 - 0.20 K/uL Final    nRBC 12/28/2023 0  0 /100 WBC Final    Gran % 12/28/2023 53.4  38.0 - 73.0 % Final    Lymph % 12/28/2023 29.7  18.0 - 48.0 % Final    Mono % 12/28/2023 9.8  4.0 - 15.0 % Final    Eosinophil % 12/28/2023 5.3  0.0 - 8.0 % Final    Basophil % 12/28/2023 1.6  0.0 - 1.9 % Final    Differential Method 12/28/2023 Automated   Final    PSA, Screen 12/28/2023 0.75  0.00 - 4.00 ng/mL Final    Comment: The testing method is a chemiluminescent microparticle immunoassay   manufactured by [employer] and performed on the [employer]   or   [employer] system. Values obtained with different assay manufacturers   for   methods may be different and cannot be used interchangeably.  PSA Expected levels:  Hormonal Therapy: <0.05 ng/ml  Prostatectomy: <0.01 ng/ml  Radiation Therapy: <1.00 ng/ml      Sodium 12/28/2023 141  136 - 145 mmol/L  Final    Potassium 12/28/2023 4.1  3.5 - 5.1 mmol/L Final    Chloride 12/28/2023 105  95 - 110 mmol/L Final    CO2 12/28/2023 25  23 - 29 mmol/L Final    Glucose 12/28/2023 91  70 - 110 mg/dL Final    BUN 12/28/2023 18  2 - 20 mg/dL Final    Creatinine 12/28/2023 1.17  0.50 - 1.40 mg/dL Final    Calcium 12/28/2023 9.2  8.7 - 10.5 mg/dL Final    Total Protein 12/28/2023 7.4  6.0 - 8.4 g/dL Final    Albumin 12/28/2023 4.7  3.5 - 5.2 g/dL Final    Total Bilirubin 12/28/2023 1.9 (H)  0.1 - 1.0 mg/dL Final    Comment: For infants and newborns, interpretation of results should be based  on gestational age, weight and in agreement with clinical  observations.    Premature Infant recommended reference ranges:  Up to 24 hours.............<8.0 mg/dL  Up to 48 hours............<12.0 mg/dL  3-5 days..................<15.0 mg/dL  6-29 days.................<15.0 mg/dL      Alkaline Phosphatase 12/28/2023 49  38 - 126 U/L Final    AST 12/28/2023 28  15 - 46 U/L Final    ALT 12/28/2023 25  10 - 44 U/L Final    Anion Gap 12/28/2023 11  8 - 16 mmol/L Final    eGFR 12/28/2023 >60.0  >60 mL/min/1.73 m^2 Final    TSH 12/28/2023 2.940  0.400 - 4.000 uIU/mL Final    Comment: Warning:  Heterophilic antibodies in serum or plasma of   certain individuals are known to cause interference with   immunoassays. These antibodies may be present in blood samples   from individuals regularly exposed to animal or who have been   treated with animal products.     Patients taking high doses of supplemental biotin may have  negatively biased results.       Hemoglobin A1C 12/28/2023 4.8  4.0 - 5.6 % Final    Comment: ADA Screening Guidelines:  5.7-6.4%  Consistent with prediabetes  >or=6.5%  Consistent with diabetes    High levels of fetal hemoglobin interfere with the HbA1C  assay. Heterozygous hemoglobin variants (HbS, HgC, etc)do  not significantly interfere with this assay.   However, presence of multiple variants may affect accuracy.      Estimated  Avg Glucose 12/28/2023 91  68 - 131 mg/dL Final       ASSESSMENT/PLAN:    1. Routine medical exam  Overview:  - preventative health counseling  - counseling on current recommendations for colon cancer screening. Colonoscopy due this year and he is scheduled  - counseling on current recommendations for shared decision making in prostate cancer screening. Asymptomatic. He opts for PSA screening. PSA normal      Orders:  -     CBC Auto Differential; Future; Expected date: 01/24/2025  -     PSA, Screening; Future; Expected date: 01/24/2025  -     Comprehensive Metabolic Panel; Future; Expected date: 01/24/2025  -     TSH; Future; Expected date: 01/24/2025  -     Hemoglobin A1C; Future; Expected date: 01/24/2025  -     Lipid Panel; Future; Expected date: 01/24/2025  -     Ambulatory referral/consult to Gastroenterology; Future; Expected date: 01/31/2024    2. History of colon polyps  Overview:  - followed by GI Dr. Malcolm Kinsey  - 2/27/2019 colonoscopy 1 polyp removed and recommended to follow-up 5 years  - colonoscopy due 2/2024 and scheduled for consult 2/1/24      3. Elevated bilirubin  Overview:  Lab Results   Component Value Date    BILITOT 1.9 (H) 12/28/2023       - chronic  - asymptomatic  - stable            ORDERS:   Orders Placed This Encounter    CBC Auto Differential    PSA, Screening    Comprehensive Metabolic Panel    TSH    Hemoglobin A1C    Lipid Panel    Ambulatory referral/consult to Gastroenterology       Vaccines recommended: covid-19    Follow-up in 1 year with labs or sooner if any concerns.      This note is dictated using the M*Modal Fluency Direct word recognition program. There are word recognition mistakes that are occasionally missed on review.    Dr. Gaye Martin D.O.   Family Medicine

## 2024-03-04 ENCOUNTER — TELEPHONE (OUTPATIENT)
Dept: PRIMARY CARE CLINIC | Facility: CLINIC | Age: 59
End: 2024-03-04
Payer: COMMERCIAL

## 2024-03-04 ENCOUNTER — PATIENT OUTREACH (OUTPATIENT)
Dept: ADMINISTRATIVE | Facility: HOSPITAL | Age: 59
End: 2024-03-04
Payer: COMMERCIAL

## 2024-03-04 NOTE — TELEPHONE ENCOUNTER
Please request copy of Colonoscopy Dr. Malcolm Shannon [place]    Dr. Gaye Martin D.O.   [facility]

## 2024-03-14 ENCOUNTER — TELEPHONE (OUTPATIENT)
Dept: UROLOGY | Facility: CLINIC | Age: 59
End: 2024-03-14
Payer: COMMERCIAL

## 2024-03-14 NOTE — TELEPHONE ENCOUNTER
----- Message from Phuong Ralph sent at 3/14/2024  4:27 PM CDT -----  Type:  Sooner Appointment Request    Caller is requesting a sooner appointment.  Caller declined first available appointment listed below.  Caller will not accept being placed on the waitlist and is requesting a message be sent to doctor.  Name of Caller: Pt   When is the first available appointment? October   Symptoms: Prostate Exam   Would the patient rather a call back or a response via [facility]? Call back   Best Call Back Number: [phone]  Additional Information: Please be advised, pt states that he only wants to be scheduled w/

## 2024-03-15 ENCOUNTER — PATIENT OUTREACH (OUTPATIENT)
Dept: ADMINISTRATIVE | Facility: HOSPITAL | Age: 59
End: 2024-03-15
Payer: COMMERCIAL

## 2024-04-29 PROBLEM — Z00.00 ROUTINE MEDICAL EXAM: Status: RESOLVED | Noted: 2024-01-24 | Resolved: 2024-04-29

## 2024-05-14 ENCOUNTER — OFFICE VISIT (OUTPATIENT)
Dept: UROLOGY | Facility: CLINIC | Age: 59
End: 2024-05-14
Payer: COMMERCIAL

## 2024-05-14 VITALS
SYSTOLIC BLOOD PRESSURE: 131 MMHG | HEART RATE: 71 BPM | WEIGHT: 208.13 LBS | HEIGHT: 73 IN | BODY MASS INDEX: 27.59 KG/M2 | DIASTOLIC BLOOD PRESSURE: 86 MMHG

## 2024-05-14 DIAGNOSIS — I86.1 LEFT VARICOCELE: Primary | ICD-10-CM

## 2024-05-14 DIAGNOSIS — Z84.2 FAMILY HISTORY OF BPH: ICD-10-CM

## 2024-05-14 PROCEDURE — 99213 OFFICE O/P EST LOW 20 MIN: CPT | Mod: S$GLB,,, | Performed by: UROLOGY

## 2024-05-14 PROCEDURE — 99999 PR PBB SHADOW E&M-EST. PATIENT-LVL III: CPT | Mod: PBBFAC,,, | Performed by: UROLOGY

## 2024-05-14 NOTE — PROGRESS NOTES
Subjective:      Patient ID: Marty Olivarez is a 58 y.o. male.    Chief Complaint: Follow-up    57 yo WM with family hx of BPH.  The patient is on no medications for BPH and has no symptoms.  He is status post vasectomy 4 years ago.  The patient's PSA is normal at 0.75.  The patient sees Dr. Gaye baez for primary care who is managing most of his issues.  If he has an abnormal PSA or has any urinary issues he will follow up me in the future.  He is welcome to see me annually if necessary.    Follow-up  Chronicity: Annual F/U. The current episode started more than 1 year ago. The problem occurs constantly. The problem has been unchanged. Pertinent negatives include no abdominal pain, anorexia, arthralgias, change in bowel habit, chest pain, chills, congestion, coughing, diaphoresis, fatigue, fever, headaches, joint swelling, myalgias, nausea, neck pain, numbness, rash, sore throat, swollen glands, urinary symptoms, vertigo, visual change, vomiting or weakness. Nothing aggravates the symptoms. He has tried nothing for the symptoms. The treatment provided significant relief.     Review of Systems   Constitutional:  Negative for activity change, appetite change, chills, diaphoresis, fatigue, fever and unexpected weight change.   HENT:  Negative for congestion, hearing loss, sinus pressure, sore throat and trouble swallowing.    Eyes:  Negative for photophobia, pain, discharge and visual disturbance.   Respiratory:  Negative for apnea, cough and shortness of breath.    Cardiovascular:  Negative for chest pain, palpitations and leg swelling.   Gastrointestinal:  Negative for abdominal distention, abdominal pain, anal bleeding, anorexia, blood in stool, change in bowel habit, constipation, diarrhea, nausea, rectal pain and vomiting.   Endocrine: Negative for cold intolerance, heat intolerance, polydipsia, polyphagia and polyuria.   Genitourinary:  Negative for decreased urine volume, difficulty urinating, dysuria,  enuresis, flank pain, frequency, genital sores, hematuria, penile discharge, penile pain, penile swelling, scrotal swelling, testicular pain and urgency.   Musculoskeletal:  Negative for arthralgias, back pain, joint swelling, myalgias and neck pain.   Skin:  Negative for color change, pallor, rash and wound.   Allergic/Immunologic: Negative for environmental allergies, food allergies and immunocompromised state.   Neurological:  Negative for dizziness, vertigo, seizures, weakness, numbness and headaches.   Hematological:  Negative for adenopathy. Does not bruise/bleed easily.   Psychiatric/Behavioral: Negative.        Objective:     Physical Exam  Vitals and nursing note reviewed.   Constitutional:       Appearance: Normal appearance. He is well-developed.   HENT:      Head: Normocephalic and atraumatic.      Right Ear: External ear normal.      Left Ear: External ear normal.      Nose: Nose normal.      Mouth/Throat:      Mouth: Mucous membranes are moist.      Pharynx: Oropharynx is clear. No oropharyngeal exudate or posterior oropharyngeal erythema.   Eyes:      General:         Right eye: No discharge.         Left eye: No discharge.      Extraocular Movements: Extraocular movements intact.      Conjunctiva/sclera: Conjunctivae normal.      Pupils: Pupils are equal, round, and reactive to light.   Cardiovascular:      Rate and Rhythm: Normal rate and regular rhythm.      Heart sounds: Normal heart sounds.   Pulmonary:      Effort: Pulmonary effort is normal.   Abdominal:      General: Bowel sounds are normal. There is no distension.      Palpations: Abdomen is soft. There is no mass.      Tenderness: There is no abdominal tenderness. There is no right CVA tenderness, left CVA tenderness, guarding or rebound.      Hernia: No hernia is present.   Genitourinary:     Penis: Normal.       Testes: Normal.      Comments: Patient with no CVA tenderness, normal penis and scrotum.  Bladder nontender. Left Varicocele  Repair  Musculoskeletal:         General: Normal range of motion.      Cervical back: Normal range of motion and neck supple.      Right lower leg: No edema.      Left lower leg: No edema.   Skin:     General: Skin is warm and dry.      Capillary Refill: Capillary refill takes less than 2 seconds.      Findings: No lesion or rash.   Neurological:      General: No focal deficit present.      Mental Status: He is alert and oriented to person, place, and time.      Deep Tendon Reflexes: Reflexes are normal and symmetric.   Psychiatric:         Mood and Affect: Mood normal.         Behavior: Behavior normal.         Thought Content: Thought content normal.         Judgment: Judgment normal.        Assessment:      1. Left varicocele    2. Family history of BPH      Plan:     Patient Instructions   F/U Annually and prn.  PSA with Dr. Martin yearly.

## 2024-06-05 ENCOUNTER — TELEPHONE (OUTPATIENT)
Dept: PRIMARY CARE CLINIC | Facility: CLINIC | Age: 59
End: 2024-06-05
Payer: COMMERCIAL

## 2024-06-05 NOTE — TELEPHONE ENCOUNTER
----- Message from Leeanna Silveira sent at 6/5/2024  1:05 PM CDT -----  Type:   Appointment Request      Name of Caller:pt  When is the first available appointment?n/a  Symptoms:f/u on hernia  Best Call Back Number: [phone]  Additional Information:

## 2024-08-14 ENCOUNTER — PATIENT MESSAGE (OUTPATIENT)
Dept: PRIMARY CARE CLINIC | Facility: CLINIC | Age: 59
End: 2024-08-14
Payer: COMMERCIAL

## 2024-08-15 NOTE — TELEPHONE ENCOUNTER
Please let him know that his annual labs are not due until 12/28/24 or 1/2025. He can do them early if he likes but insurance may give him problems with his prostate labs getting done early.  You can schedule the labs that I ordered for 1/2025 they are accessible. I just tried.   Dr. Gaye Martin D.O.   [facility]
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
## 2024-09-13 ENCOUNTER — HOSPITAL ENCOUNTER (EMERGENCY)
Facility: HOSPITAL | Age: 59
Discharge: HOME | End: 2024-09-14
Attending: EMERGENCY MEDICINE
Payer: COMMERCIAL

## 2024-09-13 DIAGNOSIS — S01.81XA LACERATION OF FOREHEAD, INITIAL ENCOUNTER: ICD-10-CM
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
## 2024-09-13 DIAGNOSIS — S01.111A LACERATION OF RIGHT EYEBROW, INITIAL ENCOUNTER: ICD-10-CM

## 2024-09-13 PROCEDURE — 99284 EMERGENCY DEPT VISIT MOD MDM: CPT | Mod: 25

## 2024-09-13 RX ORDER — LIDOCAINE HYDROCHLORIDE AND EPINEPHRINE 10; 10 MG/ML; UG/ML
10 INJECTION, SOLUTION INFILTRATION; PERINEURAL ONCE
Status: COMPLETED | OUTPATIENT
Start: 2024-09-14 | End: 2024-09-14

## 2024-09-14 VITALS
DIASTOLIC BLOOD PRESSURE: 64 MMHG | RESPIRATION RATE: 17 BRPM | HEART RATE: 99 BPM | TEMPERATURE: 98 F | HEIGHT: 72 IN | OXYGEN SATURATION: 98 % | SYSTOLIC BLOOD PRESSURE: 150 MMHG | BODY MASS INDEX: 27.77 KG/M2 | WEIGHT: 205 LBS

## 2024-09-14 PROCEDURE — 25000003 PHARM REV CODE 250: Performed by: EMERGENCY MEDICINE

## 2024-09-14 PROCEDURE — 12052 INTMD RPR FACE/MM 2.6-5.0 CM: CPT

## 2024-09-14 RX ORDER — BACITRACIN ZINC 500 UNIT/G
OINTMENT (GRAM) TOPICAL 2 TIMES DAILY
Qty: 30 G | Refills: 0 | Status: SHIPPED | OUTPATIENT
Start: 2024-09-14

## 2024-09-14 RX ORDER — BACITRACIN ZINC 500 [USP'U]/G
1 OINTMENT TOPICAL
Status: COMPLETED | OUTPATIENT
Start: 2024-09-14 | End: 2024-09-14

## 2024-09-14 RX ADMIN — BACITRACIN 1 EACH: 500 OINTMENT TOPICAL at 01:09

## 2024-09-14 RX ADMIN — LIDOCAINE HYDROCHLORIDE AND EPINEPHRINE 10 ML: 10; 10 INJECTION, SOLUTION INFILTRATION; PERINEURAL at 12:09
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
## 2024-09-19 ENCOUNTER — OFFICE VISIT (OUTPATIENT)
Dept: PRIMARY CARE CLINIC | Facility: CLINIC | Age: 59
End: 2024-09-19
Attending: FAMILY MEDICINE
Payer: COMMERCIAL

## 2024-09-19 VITALS
DIASTOLIC BLOOD PRESSURE: 84 MMHG | SYSTOLIC BLOOD PRESSURE: 129 MMHG | WEIGHT: 202.63 LBS | BODY MASS INDEX: 27.44 KG/M2 | HEART RATE: 72 BPM | OXYGEN SATURATION: 98 % | HEIGHT: 72 IN

## 2024-09-19 DIAGNOSIS — R19.00 RIGHT FLANK MASS: Primary | ICD-10-CM

## 2024-09-19 DIAGNOSIS — Z48.02 ENCOUNTER FOR REMOVAL OF SUTURES: ICD-10-CM

## 2024-09-19 DIAGNOSIS — Z86.010 HISTORY OF COLON POLYPS: ICD-10-CM

## 2024-09-19 PROCEDURE — 99214 OFFICE O/P EST MOD 30 MIN: CPT | Mod: S$GLB,,, | Performed by: FAMILY MEDICINE

## 2024-09-19 PROCEDURE — 99999 PR PBB SHADOW E&M-EST. PATIENT-LVL III: CPT | Mod: PBBFAC,,, | Performed by: FAMILY MEDICINE

## 2024-09-19 NOTE — Clinical Note
Schedule CT abdomen pelvis [facility]  And annual labs for 12/28/24 or later. No follow-up visit needed

## 2024-09-19 NOTE — LETTER
September 19, 2024      [facility]  [address] [address]  Phone: [phone]  Fax: [phone]       Patient: Marty Olivarez   YOB: 1965  Date of Visit: 09/19/2024    To Whom It May Concern:    Brenda Olivarez  was at [facility] on 09/19/2024. The patient may return to work on 09/19/2024 with no restrictions. If you have any questions or concerns, or if I can be of further assistance, please do not hesitate to contact me.    Sincerely,    Dr. Gaye Martin D.O.   Family Medicine

## 2024-09-20 ENCOUNTER — TELEPHONE (OUTPATIENT)
Dept: PRIMARY CARE CLINIC | Facility: CLINIC | Age: 59
End: 2024-09-20
Payer: COMMERCIAL

## 2024-09-20 NOTE — TELEPHONE ENCOUNTER
----- Message from Gaye Martin DO sent at 9/19/2024  1:55 PM CDT -----  Schedule CT abdomen pelvis [facility]   And annual labs for 12/28/24 or later. No follow-up visit needed

## 2024-09-24 ENCOUNTER — PATIENT MESSAGE (OUTPATIENT)
Dept: PRIMARY CARE CLINIC | Facility: CLINIC | Age: 59
End: 2024-09-24
Payer: COMMERCIAL

## 2024-10-22 ENCOUNTER — PATIENT MESSAGE (OUTPATIENT)
Dept: RESEARCH | Facility: HOSPITAL | Age: 59
End: 2024-10-22
Payer: COMMERCIAL

## 2024-12-30 PROBLEM — E78.2 MIXED HYPERLIPIDEMIA: Status: ACTIVE | Noted: 2024-12-30

## 2025-05-03 ENCOUNTER — PATIENT MESSAGE (OUTPATIENT)
Dept: UROLOGY | Facility: CLINIC | Age: 60
End: 2025-05-03
Payer: COMMERCIAL

## 2025-06-18 ENCOUNTER — PATIENT MESSAGE (OUTPATIENT)
Dept: PRIMARY CARE CLINIC | Facility: CLINIC | Age: 60
End: 2025-06-18
Payer: COMMERCIAL

## 2025-06-19 ENCOUNTER — PATIENT MESSAGE (OUTPATIENT)
Dept: PRIMARY CARE CLINIC | Facility: CLINIC | Age: 60
End: 2025-06-19
Payer: COMMERCIAL

## 2025-06-30 ENCOUNTER — RESULTS FOLLOW-UP (OUTPATIENT)
Dept: PRIMARY CARE CLINIC | Facility: CLINIC | Age: 60
End: 2025-06-30

## 2025-08-12 ENCOUNTER — OFFICE VISIT (OUTPATIENT)
Dept: UROLOGY | Facility: CLINIC | Age: 60
End: 2025-08-12
Payer: COMMERCIAL

## 2025-08-12 VITALS
SYSTOLIC BLOOD PRESSURE: 125 MMHG | WEIGHT: 195.44 LBS | OXYGEN SATURATION: 100 % | DIASTOLIC BLOOD PRESSURE: 84 MMHG | HEART RATE: 76 BPM | BODY MASS INDEX: 26.51 KG/M2

## 2025-08-12 DIAGNOSIS — R39.198 DECREASED URINE STREAM: ICD-10-CM

## 2025-08-12 DIAGNOSIS — R30.0 DYSURIA: Primary | ICD-10-CM

## 2025-08-12 DIAGNOSIS — R35.1 NOCTURIA: ICD-10-CM

## 2025-08-12 DIAGNOSIS — Z84.2 FAMILY HISTORY OF BPH: ICD-10-CM

## 2025-08-12 LAB
BACTERIA #/AREA URNS AUTO: ABNORMAL /HPF
BILIRUB UR QL STRIP.AUTO: NEGATIVE
CLARITY UR: CLEAR
COLOR UR AUTO: YELLOW
GLUCOSE UR QL STRIP: NEGATIVE
HGB UR QL STRIP: NEGATIVE
KETONES UR QL STRIP: NEGATIVE
LEUKOCYTE ESTERASE UR QL STRIP: ABNORMAL
MICROSCOPIC COMMENT: ABNORMAL
NITRITE UR QL STRIP: NEGATIVE
PH UR STRIP: 6 [PH]
PROT UR QL STRIP: ABNORMAL
RBC #/AREA URNS AUTO: 1 /HPF (ref 0–4)
SP GR UR STRIP: 1.02
SQUAMOUS #/AREA URNS AUTO: 1 /HPF
UROBILINOGEN UR STRIP-ACNC: >=8 EU/DL
WBC #/AREA URNS AUTO: 19 /HPF (ref 0–5)

## 2025-08-12 PROCEDURE — 87086 URINE CULTURE/COLONY COUNT: CPT | Performed by: UROLOGY

## 2025-08-12 PROCEDURE — 99214 OFFICE O/P EST MOD 30 MIN: CPT | Mod: S$GLB,,, | Performed by: UROLOGY

## 2025-08-12 PROCEDURE — 81001 URINALYSIS AUTO W/SCOPE: CPT | Performed by: UROLOGY

## 2025-08-12 PROCEDURE — 99999 PR PBB SHADOW E&M-EST. PATIENT-LVL III: CPT | Mod: PBBFAC,,, | Performed by: UROLOGY

## 2025-08-12 RX ORDER — SULFAMETHOXAZOLE AND TRIMETHOPRIM 800; 160 MG/1; MG/1
1 TABLET ORAL 2 TIMES DAILY
Qty: 28 TABLET | Refills: 1 | Status: SHIPPED | OUTPATIENT
Start: 2025-08-12 | End: 2025-08-26

## 2025-08-13 ENCOUNTER — PATIENT MESSAGE (OUTPATIENT)
Dept: UROLOGY | Facility: CLINIC | Age: 60
End: 2025-08-13
Payer: COMMERCIAL

## 2025-08-14 LAB — BACTERIA UR CULT: NO GROWTH

## 2025-08-18 ENCOUNTER — TELEPHONE (OUTPATIENT)
Dept: UROLOGY | Facility: CLINIC | Age: 60
End: 2025-08-18
Payer: COMMERCIAL

## 2025-08-19 ENCOUNTER — PATIENT MESSAGE (OUTPATIENT)
Dept: UROLOGY | Facility: CLINIC | Age: 60
End: 2025-08-19
Payer: COMMERCIAL

## 2025-08-25 ENCOUNTER — PATIENT MESSAGE (OUTPATIENT)
Dept: PRIMARY CARE CLINIC | Facility: CLINIC | Age: 60
End: 2025-08-25
Payer: COMMERCIAL

## 2025-08-27 RX ORDER — TADALAFIL 5 MG/1
5 TABLET ORAL DAILY
Qty: 90 TABLET | Refills: 3 | Status: SHIPPED | OUTPATIENT
Start: 2025-08-27 | End: 2026-08-27

## (undated) DEVICE — ELECTRODE REM PLYHSV RETURN 9

## (undated) DEVICE — SUT MONOCRYL 4-0 PS-2

## (undated) DEVICE — SUT VICRYL 3-0 27 SH

## (undated) DEVICE — SPONGE DERMACEA 4X4IN 12PLY

## (undated) DEVICE — SUT PROLENE 0 MO6 30IN BLUE

## (undated) DEVICE — DRAPE LAP T SHT W/ INSTR PAD

## (undated) DEVICE — ADHESIVE MASTISOL VIAL 48/BX

## (undated) DEVICE — GOWN POLY REINF BRTH SLV XL

## (undated) DEVICE — TRAY MINOR GEN SURG OMC

## (undated) DEVICE — DRESSING TEGADERM 4.4X5IN

## (undated) DEVICE — CLOSURE SKIN STERI STRIP 1/2X4

## (undated) DEVICE — DRESSING TRANS 4X4 TEGADERM